# Patient Record
Sex: MALE | Race: OTHER | NOT HISPANIC OR LATINO | URBAN - METROPOLITAN AREA
[De-identification: names, ages, dates, MRNs, and addresses within clinical notes are randomized per-mention and may not be internally consistent; named-entity substitution may affect disease eponyms.]

---

## 2019-10-07 ENCOUNTER — OUTPATIENT (OUTPATIENT)
Dept: OUTPATIENT SERVICES | Facility: HOSPITAL | Age: 69
LOS: 1 days | Discharge: HOME | End: 2019-10-07

## 2019-10-07 DIAGNOSIS — H90.3 SENSORINEURAL HEARING LOSS, BILATERAL: ICD-10-CM

## 2019-12-05 ENCOUNTER — OUTPATIENT (OUTPATIENT)
Dept: OUTPATIENT SERVICES | Facility: HOSPITAL | Age: 69
LOS: 1 days | Discharge: HOME | End: 2019-12-05

## 2019-12-06 DIAGNOSIS — H90.3 SENSORINEURAL HEARING LOSS, BILATERAL: ICD-10-CM

## 2019-12-19 ENCOUNTER — OUTPATIENT (OUTPATIENT)
Dept: OUTPATIENT SERVICES | Facility: HOSPITAL | Age: 69
LOS: 1 days | Discharge: HOME | End: 2019-12-19

## 2019-12-19 DIAGNOSIS — H90.8 MIXED CONDUCTIVE AND SENSORINEURAL HEARING LOSS, UNSPECIFIED: ICD-10-CM

## 2020-01-21 ENCOUNTER — OUTPATIENT (OUTPATIENT)
Dept: OUTPATIENT SERVICES | Facility: HOSPITAL | Age: 70
LOS: 1 days | Discharge: HOME | End: 2020-01-21

## 2020-01-21 DIAGNOSIS — H90.3 SENSORINEURAL HEARING LOSS, BILATERAL: ICD-10-CM

## 2020-02-25 ENCOUNTER — OUTPATIENT (OUTPATIENT)
Dept: OUTPATIENT SERVICES | Facility: HOSPITAL | Age: 70
LOS: 1 days | Discharge: HOME | End: 2020-02-25

## 2020-02-25 DIAGNOSIS — H90.3 SENSORINEURAL HEARING LOSS, BILATERAL: ICD-10-CM

## 2020-03-10 ENCOUNTER — OUTPATIENT (OUTPATIENT)
Dept: OUTPATIENT SERVICES | Facility: HOSPITAL | Age: 70
LOS: 1 days | Discharge: HOME | End: 2020-03-10

## 2020-03-10 DIAGNOSIS — H90.3 SENSORINEURAL HEARING LOSS, BILATERAL: ICD-10-CM

## 2020-05-06 ENCOUNTER — OUTPATIENT (OUTPATIENT)
Dept: OUTPATIENT SERVICES | Facility: HOSPITAL | Age: 70
LOS: 1 days | Discharge: HOME | End: 2020-05-06

## 2020-05-06 DIAGNOSIS — H91.23 SUDDEN IDIOPATHIC HEARING LOSS, BILATERAL: ICD-10-CM

## 2020-06-12 ENCOUNTER — OUTPATIENT (OUTPATIENT)
Dept: OUTPATIENT SERVICES | Facility: HOSPITAL | Age: 70
LOS: 1 days | Discharge: HOME | End: 2020-06-12

## 2020-06-12 DIAGNOSIS — H90.3 SENSORINEURAL HEARING LOSS, BILATERAL: ICD-10-CM

## 2021-01-02 ENCOUNTER — INPATIENT (INPATIENT)
Facility: HOSPITAL | Age: 71
LOS: 7 days | Discharge: HOME | End: 2021-01-10
Attending: HOSPITALIST | Admitting: HOSPITALIST
Payer: MEDICARE

## 2021-01-02 VITALS
DIASTOLIC BLOOD PRESSURE: 68 MMHG | RESPIRATION RATE: 20 BRPM | SYSTOLIC BLOOD PRESSURE: 140 MMHG | TEMPERATURE: 99 F | HEART RATE: 87 BPM | OXYGEN SATURATION: 95 %

## 2021-01-02 PROCEDURE — 99291 CRITICAL CARE FIRST HOUR: CPT

## 2021-01-02 PROCEDURE — 93010 ELECTROCARDIOGRAM REPORT: CPT

## 2021-01-02 RX ORDER — ONDANSETRON 8 MG/1
4 TABLET, FILM COATED ORAL ONCE
Refills: 0 | Status: COMPLETED | OUTPATIENT
Start: 2021-01-02 | End: 2021-01-02

## 2021-01-02 NOTE — ED PROVIDER NOTE - NS ED ROS FT
Constitutional: no fever, chills, no recent weight loss, change in appetite  Eyes: no redness/discharge/pain/vision changes  ENT: no rhinorrhea/ear pain/sore throat  Cardiac: No chest pain, SOB or edema.  Respiratory: No cough or respiratory distress  GI: No diarrhea or abdominal pain.  : No dysuria, frequency, urgency or hematuria  MS: no pain to back or extremities, no loss of ROM  Neuro: No headache or weakness. No LOC.  Skin: No skin rash.  Except as documented in the HPI, all other systems are negative.

## 2021-01-02 NOTE — ED PROVIDER NOTE - CLINICAL SUMMARY MEDICAL DECISION MAKING FREE TEXT BOX
patient remained hemodynamically stable in ED, patient with DKA, and elevated D-dimer in the setting of COVID-19, consulted ICU, patient is accepted to ICU and they will follow the chest CTA when the results are available. CTA chest was ordered, and called radiology core few times and CT tech few times, and requested to have the CTA chest done ASAP. CTA chest was done after the admission to ICU, I called the radiology core and requested them to read the CTA chest and results reviewed upon available, no PE. All the information was communicated with ICU fellow x 2 and ICU MAR and patient in detail. Patient is admitted to ICU in stable condition for further care.

## 2021-01-02 NOTE — ED PROVIDER NOTE - PHYSICAL EXAMINATION
CONSTITUTIONAL: Well-appearing; well-nourished; in no apparent distress.   CARDIOVASCULAR: Normal S1, S2; no murmurs, rubs, or gallops.   RESPIRATORY: Normal chest excursion with respiration; breath sounds clear and equal bilaterally; no wheezes, rhonchi, or rales.  GI/: non-distended; non-tender; no palpable organomegaly.   SKIN: Normal for age and race; warm; dry; good turgor; no apparent lesions or exudate.   NEURO/PSYCH: A & O x 4; grossly unremarkable.

## 2021-01-02 NOTE — ED PROVIDER NOTE - OBJECTIVE STATEMENT
pt with pmhx PUD and DM, recently dx with covid19 1 week ago presents with generalized malaise and weakness as well as nausea/vomiting for 2 days. Denies fever/chill/HA/dizziness/chest pain/palpitation/sob/abd pain/d/ black stool/bloody stool/urinary sxs

## 2021-01-02 NOTE — ED PROVIDER NOTE - ATTENDING CONTRIBUTION TO CARE
Patient is c/o generalized weakness with nausea x 2 days, h/o COVID-19, diagnosed one week ago, has been recovering at home. patient HA/neck pain/back pain, denies abdominal pain, no appetite, and has not been eating well x 2 days. No trauma, no falls. +chest pain and sob on coughing. +generalized weakness with intense nausea.   vitals reviewed.   patient is awake, alert, resting comfortably on the bed, is c/o nausea.   lungs: B/L scattered rhonchi, no crackles  abd: +BS, NT, ND, soft  CNS: awake, alert, o x 3, no meningeal signs noted.   Skin: no rash, no lesions.   A/P: Generalized weakness/nausea  Patient with h/o COVID-19, and DM  labs, IVF, EKG, CXR, reevaluation.

## 2021-01-02 NOTE — ED ADULT NURSE NOTE - OBJECTIVE STATEMENT
Pt. complains of cough accompanied with nausea. Pt. tested (+) COVID approx 1 week ago. Pt. denies any fever, chills, or CP.

## 2021-01-02 NOTE — ED ADULT NURSE NOTE - NSIMPLEMENTINTERV_GEN_ALL_ED
Implemented All Universal Safety Interventions:  [place] to call system. Call bell, personal items and telephone within reach. Instruct patient to call for assistance. Room bathroom lighting operational. Non-slip footwear when patient is off stretcher. Physically safe environment: no spills, clutter or unnecessary equipment. Stretcher in lowest position, wheels locked, appropriate side rails in place.

## 2021-01-03 LAB
ALBUMIN SERPL ELPH-MCNC: 3 G/DL — LOW (ref 3.5–5.2)
ALBUMIN SERPL ELPH-MCNC: 3 G/DL — LOW (ref 3.5–5.2)
ALBUMIN SERPL ELPH-MCNC: 3.3 G/DL — LOW (ref 3.5–5.2)
ALP SERPL-CCNC: 70 U/L — SIGNIFICANT CHANGE UP (ref 30–115)
ALP SERPL-CCNC: 70 U/L — SIGNIFICANT CHANGE UP (ref 30–115)
ALP SERPL-CCNC: 82 U/L — SIGNIFICANT CHANGE UP (ref 30–115)
ALT FLD-CCNC: 38 U/L — SIGNIFICANT CHANGE UP (ref 0–41)
ALT FLD-CCNC: 39 U/L — SIGNIFICANT CHANGE UP (ref 0–41)
ALT FLD-CCNC: 44 U/L — HIGH (ref 0–41)
ANION GAP SERPL CALC-SCNC: 12 MMOL/L — SIGNIFICANT CHANGE UP (ref 7–14)
ANION GAP SERPL CALC-SCNC: 16 MMOL/L — HIGH (ref 7–14)
ANION GAP SERPL CALC-SCNC: 21 MMOL/L — HIGH (ref 7–14)
ANION GAP SERPL CALC-SCNC: 22 MMOL/L — HIGH (ref 7–14)
AST SERPL-CCNC: 46 U/L — HIGH (ref 0–41)
AST SERPL-CCNC: 48 U/L — HIGH (ref 0–41)
AST SERPL-CCNC: 54 U/L — HIGH (ref 0–41)
B-OH-BUTYR SERPL-SCNC: 5.1 MMOL/L — HIGH
B-OH-BUTYR SERPL-SCNC: 5.5 MMOL/L — HIGH
BASE EXCESS BLDV CALC-SCNC: -1.8 MMOL/L — SIGNIFICANT CHANGE UP (ref -2–2)
BASE EXCESS BLDV CALC-SCNC: -2 MMOL/L — SIGNIFICANT CHANGE UP (ref -2–2)
BASOPHILS # BLD AUTO: 0.02 K/UL — SIGNIFICANT CHANGE UP (ref 0–0.2)
BASOPHILS NFR BLD AUTO: 0.6 % — SIGNIFICANT CHANGE UP (ref 0–1)
BILIRUB SERPL-MCNC: 0.7 MG/DL — SIGNIFICANT CHANGE UP (ref 0.2–1.2)
BILIRUB SERPL-MCNC: 0.7 MG/DL — SIGNIFICANT CHANGE UP (ref 0.2–1.2)
BILIRUB SERPL-MCNC: 0.9 MG/DL — SIGNIFICANT CHANGE UP (ref 0.2–1.2)
BUN SERPL-MCNC: 18 MG/DL — SIGNIFICANT CHANGE UP (ref 10–20)
BUN SERPL-MCNC: 22 MG/DL — HIGH (ref 10–20)
BUN SERPL-MCNC: 23 MG/DL — HIGH (ref 10–20)
BUN SERPL-MCNC: 24 MG/DL — HIGH (ref 10–20)
CA-I SERPL-SCNC: 1.16 MMOL/L — SIGNIFICANT CHANGE UP (ref 1.12–1.3)
CA-I SERPL-SCNC: 1.18 MMOL/L — SIGNIFICANT CHANGE UP (ref 1.12–1.3)
CALCIUM SERPL-MCNC: 8.7 MG/DL — SIGNIFICANT CHANGE UP (ref 8.5–10.1)
CALCIUM SERPL-MCNC: 8.8 MG/DL — SIGNIFICANT CHANGE UP (ref 8.5–10.1)
CALCIUM SERPL-MCNC: 8.9 MG/DL — SIGNIFICANT CHANGE UP (ref 8.5–10.1)
CALCIUM SERPL-MCNC: 9 MG/DL — SIGNIFICANT CHANGE UP (ref 8.5–10.1)
CHLORIDE SERPL-SCNC: 90 MMOL/L — LOW (ref 98–110)
CHLORIDE SERPL-SCNC: 92 MMOL/L — LOW (ref 98–110)
CHLORIDE SERPL-SCNC: 99 MMOL/L — SIGNIFICANT CHANGE UP (ref 98–110)
CHLORIDE SERPL-SCNC: 99 MMOL/L — SIGNIFICANT CHANGE UP (ref 98–110)
CO2 SERPL-SCNC: 20 MMOL/L — SIGNIFICANT CHANGE UP (ref 17–32)
CO2 SERPL-SCNC: 20 MMOL/L — SIGNIFICANT CHANGE UP (ref 17–32)
CO2 SERPL-SCNC: 22 MMOL/L — SIGNIFICANT CHANGE UP (ref 17–32)
CO2 SERPL-SCNC: 24 MMOL/L — SIGNIFICANT CHANGE UP (ref 17–32)
CREAT SERPL-MCNC: 0.6 MG/DL — LOW (ref 0.7–1.5)
CREAT SERPL-MCNC: 0.7 MG/DL — SIGNIFICANT CHANGE UP (ref 0.7–1.5)
CREAT SERPL-MCNC: 0.8 MG/DL — SIGNIFICANT CHANGE UP (ref 0.7–1.5)
CREAT SERPL-MCNC: 0.8 MG/DL — SIGNIFICANT CHANGE UP (ref 0.7–1.5)
D DIMER BLD IA.RAPID-MCNC: 1784 NG/ML DDU — HIGH (ref 0–230)
EOSINOPHIL # BLD AUTO: 0.01 K/UL — SIGNIFICANT CHANGE UP (ref 0–0.7)
EOSINOPHIL NFR BLD AUTO: 0.3 % — SIGNIFICANT CHANGE UP (ref 0–8)
GAS PNL BLDV: 135 MMOL/L — LOW (ref 136–145)
GAS PNL BLDV: 136 MMOL/L — SIGNIFICANT CHANGE UP (ref 136–145)
GAS PNL BLDV: SIGNIFICANT CHANGE UP
GAS PNL BLDV: SIGNIFICANT CHANGE UP
GLUCOSE BLDC GLUCOMTR-MCNC: 103 MG/DL — HIGH (ref 70–99)
GLUCOSE BLDC GLUCOMTR-MCNC: 169 MG/DL — HIGH (ref 70–99)
GLUCOSE BLDC GLUCOMTR-MCNC: 176 MG/DL — HIGH (ref 70–99)
GLUCOSE BLDC GLUCOMTR-MCNC: 180 MG/DL — HIGH (ref 70–99)
GLUCOSE BLDC GLUCOMTR-MCNC: 182 MG/DL — HIGH (ref 70–99)
GLUCOSE BLDC GLUCOMTR-MCNC: 182 MG/DL — HIGH (ref 70–99)
GLUCOSE BLDC GLUCOMTR-MCNC: 192 MG/DL — HIGH (ref 70–99)
GLUCOSE BLDC GLUCOMTR-MCNC: 195 MG/DL — HIGH (ref 70–99)
GLUCOSE BLDC GLUCOMTR-MCNC: 195 MG/DL — HIGH (ref 70–99)
GLUCOSE BLDC GLUCOMTR-MCNC: 206 MG/DL — HIGH (ref 70–99)
GLUCOSE BLDC GLUCOMTR-MCNC: 209 MG/DL — HIGH (ref 70–99)
GLUCOSE BLDC GLUCOMTR-MCNC: 213 MG/DL — HIGH (ref 70–99)
GLUCOSE BLDC GLUCOMTR-MCNC: 78 MG/DL — SIGNIFICANT CHANGE UP (ref 70–99)
GLUCOSE SERPL-MCNC: 112 MG/DL — HIGH (ref 70–99)
GLUCOSE SERPL-MCNC: 203 MG/DL — HIGH (ref 70–99)
GLUCOSE SERPL-MCNC: 341 MG/DL — HIGH (ref 70–99)
GLUCOSE SERPL-MCNC: 361 MG/DL — HIGH (ref 70–99)
HCO3 BLDV-SCNC: 23 MMOL/L — SIGNIFICANT CHANGE UP (ref 22–29)
HCO3 BLDV-SCNC: 25 MMOL/L — SIGNIFICANT CHANGE UP (ref 22–29)
HCT VFR BLD CALC: 46 % — SIGNIFICANT CHANGE UP (ref 42–52)
HCT VFR BLD CALC: 47.8 % — SIGNIFICANT CHANGE UP (ref 42–52)
HCT VFR BLDA CALC: 49.7 % — HIGH (ref 34–44)
HCT VFR BLDA CALC: 53.7 % — HIGH (ref 34–44)
HGB BLD CALC-MCNC: 16.2 G/DL — SIGNIFICANT CHANGE UP (ref 14–18)
HGB BLD CALC-MCNC: 17.5 G/DL — SIGNIFICANT CHANGE UP (ref 14–18)
HGB BLD-MCNC: 15.5 G/DL — SIGNIFICANT CHANGE UP (ref 14–18)
HGB BLD-MCNC: 16 G/DL — SIGNIFICANT CHANGE UP (ref 14–18)
IMM GRANULOCYTES NFR BLD AUTO: 0.3 % — SIGNIFICANT CHANGE UP (ref 0.1–0.3)
LACTATE BLDV-MCNC: 1.9 MMOL/L — HIGH (ref 0.5–1.6)
LACTATE BLDV-MCNC: 2.9 MMOL/L — HIGH (ref 0.5–1.6)
LYMPHOCYTES # BLD AUTO: 0.63 K/UL — LOW (ref 1.2–3.4)
LYMPHOCYTES # BLD AUTO: 18.7 % — LOW (ref 20.5–51.1)
MAGNESIUM SERPL-MCNC: 2 MG/DL — SIGNIFICANT CHANGE UP (ref 1.8–2.4)
MAGNESIUM SERPL-MCNC: 2 MG/DL — SIGNIFICANT CHANGE UP (ref 1.8–2.4)
MCHC RBC-ENTMCNC: 29.7 PG — SIGNIFICANT CHANGE UP (ref 27–31)
MCHC RBC-ENTMCNC: 30.2 PG — SIGNIFICANT CHANGE UP (ref 27–31)
MCHC RBC-ENTMCNC: 33.5 G/DL — SIGNIFICANT CHANGE UP (ref 32–37)
MCHC RBC-ENTMCNC: 33.7 G/DL — SIGNIFICANT CHANGE UP (ref 32–37)
MCV RBC AUTO: 88.7 FL — SIGNIFICANT CHANGE UP (ref 80–94)
MCV RBC AUTO: 89.5 FL — SIGNIFICANT CHANGE UP (ref 80–94)
MONOCYTES # BLD AUTO: 0.25 K/UL — SIGNIFICANT CHANGE UP (ref 0.1–0.6)
MONOCYTES NFR BLD AUTO: 7.4 % — SIGNIFICANT CHANGE UP (ref 1.7–9.3)
NEUTROPHILS # BLD AUTO: 2.45 K/UL — SIGNIFICANT CHANGE UP (ref 1.4–6.5)
NEUTROPHILS NFR BLD AUTO: 72.7 % — SIGNIFICANT CHANGE UP (ref 42.2–75.2)
NRBC # BLD: 0 /100 WBCS — SIGNIFICANT CHANGE UP (ref 0–0)
NRBC # BLD: 0 /100 WBCS — SIGNIFICANT CHANGE UP (ref 0–0)
PCO2 BLDV: 39 MMHG — LOW (ref 41–51)
PCO2 BLDV: 48 MMHG — SIGNIFICANT CHANGE UP (ref 41–51)
PH BLDV: 7.32 — SIGNIFICANT CHANGE UP (ref 7.26–7.43)
PH BLDV: 7.38 — SIGNIFICANT CHANGE UP (ref 7.26–7.43)
PHOSPHATE SERPL-MCNC: 3.7 MG/DL — SIGNIFICANT CHANGE UP (ref 2.1–4.9)
PLATELET # BLD AUTO: 204 K/UL — SIGNIFICANT CHANGE UP (ref 130–400)
PLATELET # BLD AUTO: 208 K/UL — SIGNIFICANT CHANGE UP (ref 130–400)
PO2 BLDV: 30 MMHG — SIGNIFICANT CHANGE UP (ref 20–40)
PO2 BLDV: 37 MMHG — SIGNIFICANT CHANGE UP (ref 20–40)
POTASSIUM BLDV-SCNC: 4 MMOL/L — SIGNIFICANT CHANGE UP (ref 3.3–5.6)
POTASSIUM BLDV-SCNC: 4.4 MMOL/L — SIGNIFICANT CHANGE UP (ref 3.3–5.6)
POTASSIUM SERPL-MCNC: 4.1 MMOL/L — SIGNIFICANT CHANGE UP (ref 3.5–5)
POTASSIUM SERPL-MCNC: 4.7 MMOL/L — SIGNIFICANT CHANGE UP (ref 3.5–5)
POTASSIUM SERPL-MCNC: 4.9 MMOL/L — SIGNIFICANT CHANGE UP (ref 3.5–5)
POTASSIUM SERPL-MCNC: 4.9 MMOL/L — SIGNIFICANT CHANGE UP (ref 3.5–5)
POTASSIUM SERPL-SCNC: 4.1 MMOL/L — SIGNIFICANT CHANGE UP (ref 3.5–5)
POTASSIUM SERPL-SCNC: 4.7 MMOL/L — SIGNIFICANT CHANGE UP (ref 3.5–5)
POTASSIUM SERPL-SCNC: 4.9 MMOL/L — SIGNIFICANT CHANGE UP (ref 3.5–5)
POTASSIUM SERPL-SCNC: 4.9 MMOL/L — SIGNIFICANT CHANGE UP (ref 3.5–5)
PROT SERPL-MCNC: 6 G/DL — SIGNIFICANT CHANGE UP (ref 6–8)
PROT SERPL-MCNC: 6.4 G/DL — SIGNIFICANT CHANGE UP (ref 6–8)
PROT SERPL-MCNC: 6.9 G/DL — SIGNIFICANT CHANGE UP (ref 6–8)
RAPID RVP RESULT: DETECTED
RBC # BLD: 5.14 M/UL — SIGNIFICANT CHANGE UP (ref 4.7–6.1)
RBC # BLD: 5.39 M/UL — SIGNIFICANT CHANGE UP (ref 4.7–6.1)
RBC # FLD: 12.7 % — SIGNIFICANT CHANGE UP (ref 11.5–14.5)
RBC # FLD: 12.7 % — SIGNIFICANT CHANGE UP (ref 11.5–14.5)
SAO2 % BLDV: 50 % — SIGNIFICANT CHANGE UP
SAO2 % BLDV: 67 % — SIGNIFICANT CHANGE UP
SARS-COV-2 RNA SPEC QL NAA+PROBE: DETECTED
SODIUM SERPL-SCNC: 132 MMOL/L — LOW (ref 135–146)
SODIUM SERPL-SCNC: 133 MMOL/L — LOW (ref 135–146)
SODIUM SERPL-SCNC: 135 MMOL/L — SIGNIFICANT CHANGE UP (ref 135–146)
SODIUM SERPL-SCNC: 137 MMOL/L — SIGNIFICANT CHANGE UP (ref 135–146)
TROPONIN T SERPL-MCNC: <0.01 NG/ML — SIGNIFICANT CHANGE UP
TROPONIN T SERPL-MCNC: <0.01 NG/ML — SIGNIFICANT CHANGE UP
WBC # BLD: 3.37 K/UL — LOW (ref 4.8–10.8)
WBC # BLD: 3.72 K/UL — LOW (ref 4.8–10.8)
WBC # FLD AUTO: 3.37 K/UL — LOW (ref 4.8–10.8)
WBC # FLD AUTO: 3.72 K/UL — LOW (ref 4.8–10.8)

## 2021-01-03 PROCEDURE — 71045 X-RAY EXAM CHEST 1 VIEW: CPT | Mod: 26

## 2021-01-03 PROCEDURE — 71275 CT ANGIOGRAPHY CHEST: CPT | Mod: 26

## 2021-01-03 PROCEDURE — 93010 ELECTROCARDIOGRAM REPORT: CPT

## 2021-01-03 RX ORDER — SODIUM CHLORIDE 9 MG/ML
1000 INJECTION, SOLUTION INTRAVENOUS ONCE
Refills: 0 | Status: COMPLETED | OUTPATIENT
Start: 2021-01-03 | End: 2021-01-03

## 2021-01-03 RX ORDER — INSULIN HUMAN 100 [IU]/ML
12 INJECTION, SOLUTION SUBCUTANEOUS
Qty: 100 | Refills: 0 | Status: DISCONTINUED | OUTPATIENT
Start: 2021-01-03 | End: 2021-01-03

## 2021-01-03 RX ORDER — DEXTROSE 10 % IN WATER 10 %
1000 INTRAVENOUS SOLUTION INTRAVENOUS
Refills: 0 | Status: DISCONTINUED | OUTPATIENT
Start: 2021-01-03 | End: 2021-01-03

## 2021-01-03 RX ORDER — SODIUM CHLORIDE 9 MG/ML
1000 INJECTION, SOLUTION INTRAVENOUS
Refills: 0 | Status: DISCONTINUED | OUTPATIENT
Start: 2021-01-03 | End: 2021-01-03

## 2021-01-03 RX ORDER — SODIUM CHLORIDE 9 MG/ML
1000 INJECTION, SOLUTION INTRAVENOUS
Refills: 0 | Status: DISCONTINUED | OUTPATIENT
Start: 2021-01-03 | End: 2021-01-04

## 2021-01-03 RX ORDER — DEXTROSE 50 % IN WATER 50 %
50 SYRINGE (ML) INTRAVENOUS ONCE
Refills: 0 | Status: DISCONTINUED | OUTPATIENT
Start: 2021-01-03 | End: 2021-01-03

## 2021-01-03 RX ORDER — DEXTROSE 50 % IN WATER 50 %
25 SYRINGE (ML) INTRAVENOUS ONCE
Refills: 0 | Status: COMPLETED | OUTPATIENT
Start: 2021-01-03 | End: 2021-01-03

## 2021-01-03 RX ORDER — INSULIN HUMAN 100 [IU]/ML
6 INJECTION, SOLUTION SUBCUTANEOUS
Qty: 100 | Refills: 0 | Status: DISCONTINUED | OUTPATIENT
Start: 2021-01-03 | End: 2021-01-03

## 2021-01-03 RX ORDER — ENOXAPARIN SODIUM 100 MG/ML
40 INJECTION SUBCUTANEOUS AT BEDTIME
Refills: 0 | Status: DISCONTINUED | OUTPATIENT
Start: 2021-01-03 | End: 2021-01-04

## 2021-01-03 RX ORDER — DEXTROSE 50 % IN WATER 50 %
12.5 SYRINGE (ML) INTRAVENOUS ONCE
Refills: 0 | Status: DISCONTINUED | OUTPATIENT
Start: 2021-01-03 | End: 2021-01-03

## 2021-01-03 RX ORDER — ONDANSETRON 8 MG/1
4 TABLET, FILM COATED ORAL ONCE
Refills: 0 | Status: COMPLETED | OUTPATIENT
Start: 2021-01-03 | End: 2021-01-03

## 2021-01-03 RX ORDER — INSULIN HUMAN 100 [IU]/ML
1 INJECTION, SOLUTION SUBCUTANEOUS
Qty: 100 | Refills: 0 | Status: DISCONTINUED | OUTPATIENT
Start: 2021-01-03 | End: 2021-01-04

## 2021-01-03 RX ADMIN — ONDANSETRON 4 MILLIGRAM(S): 8 TABLET, FILM COATED ORAL at 02:20

## 2021-01-03 RX ADMIN — INSULIN HUMAN 6 UNIT(S)/HR: 100 INJECTION, SOLUTION SUBCUTANEOUS at 04:19

## 2021-01-03 RX ADMIN — Medication 25 MILLILITER(S): at 13:00

## 2021-01-03 RX ADMIN — SODIUM CHLORIDE 100 MILLILITER(S): 9 INJECTION, SOLUTION INTRAVENOUS at 08:25

## 2021-01-03 RX ADMIN — SODIUM CHLORIDE 100 MILLILITER(S): 9 INJECTION, SOLUTION INTRAVENOUS at 15:00

## 2021-01-03 RX ADMIN — INSULIN HUMAN 1 UNIT(S)/HR: 100 INJECTION, SOLUTION SUBCUTANEOUS at 12:40

## 2021-01-03 RX ADMIN — SODIUM CHLORIDE 1000 MILLILITER(S): 9 INJECTION, SOLUTION INTRAVENOUS at 02:20

## 2021-01-03 RX ADMIN — ENOXAPARIN SODIUM 40 MILLIGRAM(S): 100 INJECTION SUBCUTANEOUS at 21:41

## 2021-01-03 RX ADMIN — ONDANSETRON 4 MILLIGRAM(S): 8 TABLET, FILM COATED ORAL at 00:09

## 2021-01-03 RX ADMIN — INSULIN HUMAN 12 UNIT(S)/HR: 100 INJECTION, SOLUTION SUBCUTANEOUS at 08:25

## 2021-01-03 NOTE — CONSULT NOTE ADULT - SUBJECTIVE AND OBJECTIVE BOX
Patient is a 70y old  Male who presents with a chief complaint of weakness (03 Jan 2021 07:01)      HPI:  70 year old male with a PMhx of DMII, PUD.     CC: weakness and decreased PO intake    History goes back to: 2 days ago when patient started having decreased PO intake, and started feeling very fatigued. Patients weakness got worse and so he presented to the ED    ROS is positive for: patient reports chills at home, pleuritic chest pain (only on coughing and taking a deep breath). Additionally patient reports some diffuse abdominal pain, and mild nausea.     ROS is negative for: no SOB, no palpitations. Patient denies change in urinary or bowel habits. No lower extremity edema /swelling     Of note: patient was diagnosed with COVID almost 10 days ago and he was feeling carrol until 2 days ago.     In the ED: vital signs WNL  Labs significant for: d-dimer 1700, b-oh:5, glucose: 361  Troponin: negative  CT A chest: No pulmonary embolus.  Scattered peripheral ground - glass opacities, compatible with viral/infectious etiology.  Multiple bilateral pulmonary nodules measuring up to 6 mm in the left upper lobe.  Patient admitted for DKA.      (03 Jan 2021 07:01)      PAST MEDICAL & SURGICAL HISTORY:  DM (diabetes mellitus)    No significant past surgical history        FAMILY HISTORY:  :  No known cardiovacular family hisotry     Review Of Systems:     All ROS are negative except per HPI       Allergies    No Known Allergies    Intolerances          PHYSICAL EXAM    ICU Vital Signs Last 24 Hrs  T(C): 36.4 (03 Jan 2021 08:20), Max: 37.3 (02 Jan 2021 22:01)  T(F): 97.5 (03 Jan 2021 08:20), Max: 99.1 (02 Jan 2021 22:01)  HR: 93 (03 Jan 2021 08:00) (87 - 93)  BP: 144/72 (03 Jan 2021 08:00) (127/87 - 173/88)  BP(mean): --  ABP: --  ABP(mean): --  RR: 20 (03 Jan 2021 08:00) (20 - 20)  SpO2: 99% (03 Jan 2021 08:00) (95% - 99%)      CONSTITUTIONAL:  Well nourished.   NAD    ENT:   Airway patent,   Mouth with dry mucosa.   No thrush  on 2 liters      CARDIAC:   Normal rate,   Regular rhythm.    No edema    RESPIRATORY:   No wheezing  Bilateral BS   Not tachypneic,  No use of accessory muscles  b/l crackles    GASTROINTESTINAL:  Abdomen soft,   epigastric tenderness,   No guarding,       NEUROLOGICAL:   Alert and oriented   No motor deficits.    SKIN:   Skin normal color for race,   No evidence of rash.      HEME LYMPH: .  No cervical  lymphadenopathy.  No inguinal lymphadenopathy              LABS:                          16.0   3.37  )-----------( 208      ( 03 Jan 2021 00:16 )             47.8                                               01-03    133<L>  |  92<L>  |  23<H>  ----------------------------<  341<H>  4.9   |  20  |  0.8    Ca    8.9      03 Jan 2021 05:48  Phos  3.7     01-03  Mg     2.0     01-03    TPro  6.9  /  Alb  3.3<L>  /  TBili  0.9  /  DBili  x   /  AST  54<H>  /  ALT  44<H>  /  AlkPhos  82  01-03                                                 CARDIAC MARKERS ( 03 Jan 2021 00:16 )  x     / <0.01 ng/mL / x     / x     / x                                                LIVER FUNCTIONS - ( 03 Jan 2021 00:16 )  Alb: 3.3 g/dL / Pro: 6.9 g/dL / ALK PHOS: 82 U/L / ALT: 44 U/L / AST: 54 U/L / GGT: x                                                                                                                                       X-Rays reviewed                                                                                     ECHO    CXR interpreted by me     MEDICATIONS  (STANDING):  enoxaparin Injectable 40 milliGRAM(s) SubCutaneous at bedtime  insulin regular Infusion 6 Unit(s)/Hr (6 mL/Hr) IV Continuous <Continuous>  sodium chloride 0.45%. 1000 milliLiter(s) (100 mL/Hr) IV Continuous <Continuous>    MEDICATIONS  (PRN):         Patient is a 70y old  Male who presents with a chief complaint of weakness (03 Jan 2021 07:01)      HPI:  70 year old male with a PMhx of DMII, PUD.     CC: weakness and decreased PO intake    History goes back to: 2 days ago when patient started having decreased PO intake, and started feeling very fatigued. Patients weakness got worse and so he presented to the ED    ROS is positive for: patient reports chills at home, pleuritic chest pain (only on coughing and taking a deep breath). Additionally patient reports some diffuse abdominal pain, and mild nausea.     Of note: patient was diagnosed with COVID almost 10 days ago and he was feeling carrol until 2 days ago.     In the ED: vital signs WNL  Labs significant for: d-dimer 1700, b-oh:5, glucose: 361  Troponin: negative  CT A chest: No pulmonary embolus.  Scattered peripheral ground - glass opacities, compatible with viral/infectious etiology.  Multiple bilateral pulmonary nodules measuring up to 6 mm in the left upper lobe.  Patient admitted for DKA called to [facility]      (03 Jan 2021 07:01)      PAST MEDICAL & SURGICAL HISTORY:  DM (diabetes mellitus)    No significant past surgical history        FAMILY HISTORY:  :  No known cardiovascular family history    Review Of Systems:     All ROS are negative except per HPI       Allergies    No Known Allergies    Intolerances          PHYSICAL EXAM    ICU Vital Signs Last 24 Hrs  T(C): 36.4 (03 Jan 2021 08:20), Max: 37.3 (02 Jan 2021 22:01)  T(F): 97.5 (03 Jan 2021 08:20), Max: 99.1 (02 Jan 2021 22:01)  HR: 93 (03 Jan 2021 08:00) (87 - 93)  BP: 144/72 (03 Jan 2021 08:00) (127/87 - 173/88)  RR: 20 (03 Jan 2021 08:00) (20 - 20)  SpO2: 99% (03 Jan 2021 08:00) (95% - 99%)      CONSTITUTIONAL:  ill looking    ENT:   Airway patent,   Mouth with dry mucosa.   No thrush  on 2 liters      CARDIAC:   Normal rate,   Regular rhythm.    No edema    RESPIRATORY:   No wheezing  Bilateral BS   Not tachypneic,  No use of accessory muscles  b/l crackles    GASTROINTESTINAL:  Abdomen soft,   epigastric tenderness,   No guarding,       NEUROLOGICAL:   Alert and oriented   No motor deficits.    SKIN:   Skin normal color for race,   No evidence of rash.                LABS:                          16.0   3.37  )-----------( 208      ( 03 Jan 2021 00:16 )             47.8                                               01-03    133<L>  |  92<L>  |  23<H>  ----------------------------<  341<H>  4.9   |  20  |  0.8    Ca    8.9      03 Jan 2021 05:48  Phos  3.7     01-03  Mg     2.0     01-03    TPro  6.9  /  Alb  3.3<L>  /  TBili  0.9  /  DBili  x   /  AST  54<H>  /  ALT  44<H>  /  AlkPhos  82  01-03                                                 CARDIAC MARKERS ( 03 Jan 2021 00:16 )  x     / <0.01 ng/mL / x     / x     / x                                                LIVER FUNCTIONS - ( 03 Jan 2021 00:16 )  Alb: 3.3 g/dL / Pro: 6.9 g/dL / ALK PHOS: 82 U/L / ALT: 44 U/L / AST: 54 U/L / GGT: x                                                                                                                                       X-Rays reviewed   reviewed    MEDICATIONS  (STANDING):  enoxaparin Injectable 40 milliGRAM(s) SubCutaneous at bedtime  insulin regular Infusion 6 Unit(s)/Hr (6 mL/Hr) IV Continuous <Continuous>  sodium chloride 0.45%. 1000 milliLiter(s) (100 mL/Hr) IV Continuous <Continuous>    MEDICATIONS  (PRN):

## 2021-01-03 NOTE — CONSULT NOTE ADULT - ASSESSMENT
IMPRESSION:  HAGMA secondary to DKA  Covid Pna  HO HTN    PLAN:    CNS: avoid sedatives    HEENT: Oral care    PULMONARY:  HOB @ 45 degrees.  Aspiration precautions. wean off o2. Goal Pox >94%. If requiring O2 to stay above 94 percent then start dexa 6mg q24h. start antitussive ATC.    CARDIOVASCULAR: Goal Map >65. IVF NS @ 100cc/hr, when FS <250 start D5NS. check trop x2    GI: GI prophylaxis.  Feeding.  Bowel regimen. check Lipase    RENAL:  Follow up lytes.  Correct as needed. Monitor potassium and AG qround.    INFECTIOUS DISEASE: Follow up cultures    HEMATOLOGICAL:  DVT prophylaxis.    ENDOCRINE:  Follow up FS.  Insulin drip 0.01U/kg/hr. transition to SQ after gap closes.    MUSCULOSKELETAL: bedrest    Dispo: critical care bed in CEU or ED3      IMPRESSION:    DKA  Covid Pneumonia   HO HTN    PLAN:    CNS: avoid sedatives    HEENT: Oral care    PULMONARY:  HOB @ 45 degrees.  Aspiration precautions. wean off o2. Goal Pox >94%. If requiring O2 to stay above 94 percent then start dexa 6mg q24h. start antitussive ATC.    CARDIOVASCULAR: Goal Map >65. IVF LR @ 100cc/hr, when FS <250 start D5 1/2NS. check trop x2    GI: GI prophylaxis.  Feeding.  Bowel regimen. check Lipase    RENAL:  Follow up lytes.  Correct as needed. Monitor potassium and AG qround.    INFECTIOUS DISEASE: Follow up cultures, f/up infl markers    HEMATOLOGICAL:  DVT prophylaxis.    ENDOCRINE:  Follow up FS.  Insulin drip 0.01U/kg/hr. transition to SQ after gap closes. Endo eval    MUSCULOSKELETAL: bedrest    Dispo: critical care bed in CEU or ED3

## 2021-01-03 NOTE — H&P ADULT - HISTORY OF PRESENT ILLNESS
year old   with a PMhx of       CC:    History goes back to:     ROS is positive for:     ROS is negative for:     Of note: patient was diagnosed with COVID 1 week ago    In the ED: vital signs WNL  Labs significant for: d-dimer 1700, b-oh:5, glucose: 361  Troponin: negative  EKG:   CT A chest: No pulmonary embolus.  Scattered peripheral groundglass opacities, compatible with viral/infectious etiology.  Multiple bilateral pulmonary nodules measuring up to 6 mm in the left upper lobe.    Patient admitted for DKA due to       70 year old male with a PMhx of       CC:    History goes back to:     ROS is positive for:     ROS is negative for:     Of note: patient was diagnosed with COVID 1 week ago    In the ED: vital signs WNL  Labs significant for: d-dimer 1700, b-oh:5, glucose: 361  Troponin: negative  EKG:   CT A chest: No pulmonary embolus.  Scattered peripheral groundglass opacities, compatible with viral/infectious etiology.  Multiple bilateral pulmonary nodules measuring up to 6 mm in the left upper lobe.    Patient admitted for DKA due to       70 year old male with a PMhx of DMII, PUD.     CC: weakness and decreased PO intake    History goes back to: 2 days ago when patient started having decreased PO intake, and started feeling very fatigued. Patients weakness got worse and so he presented to the ED    ROS is positive for: patient reports chills at home, pleuritic chest pain (only on coughing and taking a deep breath). Additionally patient reports some diffuse abdominal pain, and mild nausea.     ROS is negative for: no SOB, no palpitations. Patient denies change in urinary or bowel habits. No lower extremity edema /swelling     Of note: patient was diagnosed with COVID almost 10 days ago and he was feeling carrol until 2 days ago.     In the ED: vital signs WNL  Labs significant for: d-dimer 1700, b-oh:5, glucose: 361  Troponin: negative  CT A chest: No pulmonary embolus.  Scattered peripheral ground - glass opacities, compatible with viral/infectious etiology.  Multiple bilateral pulmonary nodules measuring up to 6 mm in the left upper lobe.  Patient admitted for DKA.

## 2021-01-03 NOTE — H&P ADULT - NSHPLABSRESULTS_GEN_ALL_CORE
VITALS:   T(F): 98.4  HR: 93  BP: 173/88  RR: 20  SpO2: 97%    LABS:                        16.0   3.37  )-----------( 208      ( 03 Jan 2021 00:16 )             47.8     01-03    133<L>  |  92<L>  |  23<H>  ----------------------------<  341<H>  4.9   |  20  |  0.8    Ca    8.9      03 Jan 2021 05:48  Phos  3.7     01-03  Mg     2.0     01-03    TPro  6.9  /  Alb  3.3<L>  /  TBili  0.9  /  DBili  x   /  AST  54<H>  /  ALT  44<H>  /  AlkPhos  82  01-03          Troponin T, Serum: <0.01 ng/mL (01-03-21 @ 00:16)      CARDIAC MARKERS ( 03 Jan 2021 00:16 )  x     / <0.01 ng/mL / x     / x     / x

## 2021-01-03 NOTE — H&P ADULT - NSHPPHYSICALEXAM_GEN_ALL_CORE
PHYSICAL EXAM:  GENERAL: NAD, speaks in full sentences, no signs of respiratory distress  HEAD: Atraumatic  NECK: Supple  CHEST/LUNG: Clear to auscultation bilaterally; No wheeze or crackles  HEART: S1, S2; RRR; No murmurs, rubs, or gallops  ABDOMEN: BS+; Soft, Non-tender, Non-distended  EXTREMITIES:  2+ Peripheral Pulses, No clubbing, cyanosis, or edema  PSYCH: AAOx3  NEUROLOGY: non-focal  SKIN: No rashes or lesions PHYSICAL EXAM:  GENERAL: NAD, speaks in full sentences, no signs of respiratory distress  HEAD: Atraumatic  NECK: Supple  CHEST/LUNG: Clear to auscultation bilaterally; No wheeze or crackles  HEART: S1, S2; RRR; No murmurs, rubs, or gallops  ABDOMEN: BS+; Soft, mild abdominal tenderness to palpation   EXTREMITIES:  2+ Peripheral Pulses, No clubbing, cyanosis, or edema  PSYCH: AAOx3  NEUROLOGY: non-focal  SKIN: No rashes or lesions

## 2021-01-03 NOTE — H&P ADULT - ASSESSMENT
IMPRESSION:  #DKA  #HAGMA   #COVID 19 PNA       PLAN:    CNS:    HEENT:    PULMONARY:    CARDIOVASCULAR:    GI: GI prophylaxis.  Feeding     RENAL:    INFECTIOUS DISEASE:    HEMATOLOGICAL:  DVT prophylaxis.    ENDOCRINE:  Follow up FS.  Insulin protocol if needed.    MUSCULOSKELETAL:    TIME SPENT:           IMPRESSION:  #DKA  #HAGMA   #COVID 19 PNA     PLAN:    IMPRESSION:       PLAN:    CNS: Avoid CNS depressants.    HEENT:  Oral care.     PULMONARY:  HOB @ 45 degree.     CARDIOVASCULAR: IV fluids    GI: GI prophylaxis.  NGT.  Feeding outside BiPAP  if tolerated                              RENAL:  HAGMA due to DKA  - monitor for hypokalemia and hypophosphatemia    INFECTIOUS DISEASE: COVID PNA  Moderate disease, no signs of CRS  manifestations: PNA   - follow up inflammatory markers (procal, crp, ferritin)   - patient spo2=98% on 2L oxygen  - no remdesivir given onset of symptoms   - will start dexamethasone.   - Low suspicion for concomitant bacterial PNA so will hold AN-B  - f/u Ucx, f/u Bcx  - Lovenox     HEMATOLOGICAL:  DVT prophylaxis.     ENDOCRINE:  Follow up FS.  Insulin protocol if needed.    DISPOSITION: ICU     IMPRESSION:  #DKA - precipitated by viral infection   #HAGMA   # possible pericarditis?  #COVID 19 PNA - mild       PLAN:    CNS: Avoid CNS depressants.    HEENT:  Oral care.     PULMONARY:  HOB @ 45 degree.     CARDIOVASCULAR: IV fluids, NS 100cc/hr  possible pericarditis pain?  - echocardiogram   - trend trops  - consider cardio eval     GI: GI prophylaxis.                      RENAL:  HAGMA due to DKA  - monitor for hypokalemia and hypophosphatemia    INFECTIOUS DISEASE: COVID PNA  mild disease, no signs of CRS  manifestations: PNA   - follow up inflammatory markers (procal, crp, ferritin)   - patient spo2=98% on room air  - no remdesivir given onset of symptoms   - no indication for dexamethasone unless repiratory status worsening  - Low suspicion for concomitant bacterial PNA so will hold AN-B  - Lovenox 40 daily     HEMATOLOGICAL:  DVT prophylaxis.     ENDOCRINE:  DKA  B-OH=5, AG=22, OJ=434  s/p 2L of LR fluid  - insulin drip at 12u/hr. Once FS is 200 then make insulin drip at a rate of 0.05 U/kg/hr  - IV fluids: NS 100cc/hr  - keep NPO  - BMP around the clock. Monitor anion gap, monitor K, phos  - FS every 1 hr when on insulin drip   - consider resuming diet once FS approx 200 AND patient can tolerate po intake AND anion gap is closed  - once patient tolerates PO diet then switch to long acting insulin and then stop   - hba1c for tomorrow     DISPOSITION: ICU     IMPRESSION:  #DKA - precipitated by viral infection   #HAGMA   # possible pericarditis?  #COVID 19 PNA - mild       PLAN:    CNS: Avoid CNS depressants.    HEENT:  Oral care.     PULMONARY:  HOB @ 45 degree.     CARDIOVASCULAR: IV fluids, NS 100cc/hr  possible pericarditis pain?  - echocardiogram   - trend trops  - consider cardio eval     GI: GI prophylaxis.                      RENAL:  HAGMA due to DKA  - monitor for hypokalemia and hypophosphatemia    INFECTIOUS DISEASE: COVID PNA  mild disease, no signs of CRS  manifestations: PNA   - follow up inflammatory markers (procal, crp, ferritin)   - patient spo2=98% on room air  - no remdesivir given onset of symptoms   - no indication for dexamethasone unless repiratory status worsening  - Low suspicion for concomitant bacterial PNA so will hold AN-B  - Lovenox 40 daily     HEMATOLOGICAL:  DVT prophylaxis.     ENDOCRINE:  DKA  B-OH=5, AG=22, MW=835  s/p 1L of LR fluid bolus  - insulin drip at 12u/hr. Once FS is 200 then make insulin drip at a rate of 0.05 U/kg/hr  - IV fluids: NS 100cc/hr  - keep NPO  - BMP around the clock. Monitor anion gap, monitor K, phos  - FS every 1 hr when on insulin drip   - consider resuming diet once FS approx 200 AND patient can tolerate po intake AND anion gap is closed  - once patient tolerates PO diet then switch to long acting insulin and then stop   - hba1c for tomorrow     DISPOSITION: ICU

## 2021-01-04 LAB
ALBUMIN SERPL ELPH-MCNC: 2.7 G/DL — LOW (ref 3.5–5.2)
ALP SERPL-CCNC: 71 U/L — SIGNIFICANT CHANGE UP (ref 30–115)
ALT FLD-CCNC: 39 U/L — SIGNIFICANT CHANGE UP (ref 0–41)
ANION GAP SERPL CALC-SCNC: 11 MMOL/L — SIGNIFICANT CHANGE UP (ref 7–14)
ANION GAP SERPL CALC-SCNC: 12 MMOL/L — SIGNIFICANT CHANGE UP (ref 7–14)
AST SERPL-CCNC: 54 U/L — HIGH (ref 0–41)
BILIRUB SERPL-MCNC: 1.3 MG/DL — HIGH (ref 0.2–1.2)
BUN SERPL-MCNC: 12 MG/DL — SIGNIFICANT CHANGE UP (ref 10–20)
BUN SERPL-MCNC: 14 MG/DL — SIGNIFICANT CHANGE UP (ref 10–20)
CALCIUM SERPL-MCNC: 8.2 MG/DL — LOW (ref 8.5–10.1)
CALCIUM SERPL-MCNC: 8.3 MG/DL — LOW (ref 8.5–10.1)
CHLORIDE SERPL-SCNC: 100 MMOL/L — SIGNIFICANT CHANGE UP (ref 98–110)
CHLORIDE SERPL-SCNC: 100 MMOL/L — SIGNIFICANT CHANGE UP (ref 98–110)
CO2 SERPL-SCNC: 24 MMOL/L — SIGNIFICANT CHANGE UP (ref 17–32)
CO2 SERPL-SCNC: 24 MMOL/L — SIGNIFICANT CHANGE UP (ref 17–32)
CREAT SERPL-MCNC: 0.5 MG/DL — LOW (ref 0.7–1.5)
CREAT SERPL-MCNC: 0.6 MG/DL — LOW (ref 0.7–1.5)
CRP SERPL-MCNC: 5.51 MG/DL — HIGH (ref 0–0.4)
FERRITIN SERPL-MCNC: 899 NG/ML — HIGH (ref 30–400)
FIBRINOGEN PPP-MCNC: >700 MG/DL — HIGH (ref 204.4–570.6)
GLUCOSE BLDC GLUCOMTR-MCNC: 143 MG/DL — HIGH (ref 70–99)
GLUCOSE BLDC GLUCOMTR-MCNC: 154 MG/DL — HIGH (ref 70–99)
GLUCOSE BLDC GLUCOMTR-MCNC: 159 MG/DL — HIGH (ref 70–99)
GLUCOSE BLDC GLUCOMTR-MCNC: 159 MG/DL — HIGH (ref 70–99)
GLUCOSE BLDC GLUCOMTR-MCNC: 160 MG/DL — HIGH (ref 70–99)
GLUCOSE BLDC GLUCOMTR-MCNC: 165 MG/DL — HIGH (ref 70–99)
GLUCOSE BLDC GLUCOMTR-MCNC: 165 MG/DL — HIGH (ref 70–99)
GLUCOSE BLDC GLUCOMTR-MCNC: 185 MG/DL — HIGH (ref 70–99)
GLUCOSE BLDC GLUCOMTR-MCNC: 193 MG/DL — HIGH (ref 70–99)
GLUCOSE BLDC GLUCOMTR-MCNC: 198 MG/DL — HIGH (ref 70–99)
GLUCOSE BLDC GLUCOMTR-MCNC: 200 MG/DL — HIGH (ref 70–99)
GLUCOSE BLDC GLUCOMTR-MCNC: 209 MG/DL — HIGH (ref 70–99)
GLUCOSE BLDC GLUCOMTR-MCNC: 215 MG/DL — HIGH (ref 70–99)
GLUCOSE BLDC GLUCOMTR-MCNC: 218 MG/DL — HIGH (ref 70–99)
GLUCOSE BLDC GLUCOMTR-MCNC: 224 MG/DL — HIGH (ref 70–99)
GLUCOSE SERPL-MCNC: 253 MG/DL — HIGH (ref 70–99)
GLUCOSE SERPL-MCNC: 54 MG/DL — CRITICAL LOW (ref 70–99)
HCT VFR BLD CALC: 45.2 % — SIGNIFICANT CHANGE UP (ref 42–52)
HCV AB S/CO SERPL IA: 0.03 COI — SIGNIFICANT CHANGE UP
HCV AB SERPL-IMP: SIGNIFICANT CHANGE UP
HGB BLD-MCNC: 15.4 G/DL — SIGNIFICANT CHANGE UP (ref 14–18)
INR BLD: 1.1 RATIO — SIGNIFICANT CHANGE UP (ref 0.65–1.3)
MAGNESIUM SERPL-MCNC: 1.6 MG/DL — LOW (ref 1.8–2.4)
MCHC RBC-ENTMCNC: 30 PG — SIGNIFICANT CHANGE UP (ref 27–31)
MCHC RBC-ENTMCNC: 34.1 G/DL — SIGNIFICANT CHANGE UP (ref 32–37)
MCV RBC AUTO: 87.9 FL — SIGNIFICANT CHANGE UP (ref 80–94)
NRBC # BLD: 0 /100 WBCS — SIGNIFICANT CHANGE UP (ref 0–0)
PLATELET # BLD AUTO: 218 K/UL — SIGNIFICANT CHANGE UP (ref 130–400)
POTASSIUM SERPL-MCNC: 3.8 MMOL/L — SIGNIFICANT CHANGE UP (ref 3.5–5)
POTASSIUM SERPL-MCNC: 4 MMOL/L — SIGNIFICANT CHANGE UP (ref 3.5–5)
POTASSIUM SERPL-SCNC: 3.8 MMOL/L — SIGNIFICANT CHANGE UP (ref 3.5–5)
POTASSIUM SERPL-SCNC: 4 MMOL/L — SIGNIFICANT CHANGE UP (ref 3.5–5)
PROCALCITONIN SERPL-MCNC: 0.11 NG/ML — HIGH (ref 0.02–0.1)
PROT SERPL-MCNC: 5.8 G/DL — LOW (ref 6–8)
PROTHROM AB SERPL-ACNC: 12.7 SEC — SIGNIFICANT CHANGE UP (ref 9.95–12.87)
RBC # BLD: 5.14 M/UL — SIGNIFICANT CHANGE UP (ref 4.7–6.1)
RBC # FLD: 12.8 % — SIGNIFICANT CHANGE UP (ref 11.5–14.5)
SODIUM SERPL-SCNC: 135 MMOL/L — SIGNIFICANT CHANGE UP (ref 135–146)
SODIUM SERPL-SCNC: 136 MMOL/L — SIGNIFICANT CHANGE UP (ref 135–146)
WBC # BLD: 4.32 K/UL — LOW (ref 4.8–10.8)
WBC # FLD AUTO: 4.32 K/UL — LOW (ref 4.8–10.8)

## 2021-01-04 PROCEDURE — 99232 SBSQ HOSP IP/OBS MODERATE 35: CPT

## 2021-01-04 RX ORDER — ONDANSETRON 8 MG/1
4 TABLET, FILM COATED ORAL ONCE
Refills: 0 | Status: COMPLETED | OUTPATIENT
Start: 2021-01-04 | End: 2021-01-04

## 2021-01-04 RX ORDER — MAGNESIUM SULFATE 500 MG/ML
2 VIAL (ML) INJECTION ONCE
Refills: 0 | Status: COMPLETED | OUTPATIENT
Start: 2021-01-04 | End: 2021-01-04

## 2021-01-04 RX ORDER — GLUCAGON INJECTION, SOLUTION 0.5 MG/.1ML
1 INJECTION, SOLUTION SUBCUTANEOUS ONCE
Refills: 0 | Status: DISCONTINUED | OUTPATIENT
Start: 2021-01-04 | End: 2021-01-10

## 2021-01-04 RX ORDER — SODIUM CHLORIDE 9 MG/ML
1000 INJECTION, SOLUTION INTRAVENOUS
Refills: 0 | Status: DISCONTINUED | OUTPATIENT
Start: 2021-01-04 | End: 2021-01-10

## 2021-01-04 RX ORDER — SODIUM CHLORIDE 9 MG/ML
1000 INJECTION INTRAMUSCULAR; INTRAVENOUS; SUBCUTANEOUS
Refills: 0 | Status: DISCONTINUED | OUTPATIENT
Start: 2021-01-04 | End: 2021-01-05

## 2021-01-04 RX ORDER — DEXTROSE 50 % IN WATER 50 %
25 SYRINGE (ML) INTRAVENOUS ONCE
Refills: 0 | Status: DISCONTINUED | OUTPATIENT
Start: 2021-01-04 | End: 2021-01-10

## 2021-01-04 RX ORDER — INSULIN HUMAN 100 [IU]/ML
2 INJECTION, SOLUTION SUBCUTANEOUS
Qty: 100 | Refills: 0 | Status: DISCONTINUED | OUTPATIENT
Start: 2021-01-04 | End: 2021-01-05

## 2021-01-04 RX ORDER — INSULIN GLARGINE 100 [IU]/ML
20 INJECTION, SOLUTION SUBCUTANEOUS EVERY MORNING
Refills: 0 | Status: DISCONTINUED | OUTPATIENT
Start: 2021-01-04 | End: 2021-01-07

## 2021-01-04 RX ORDER — INSULIN LISPRO 100/ML
7 VIAL (ML) SUBCUTANEOUS
Refills: 0 | Status: DISCONTINUED | OUTPATIENT
Start: 2021-01-04 | End: 2021-01-07

## 2021-01-04 RX ORDER — DEXTROSE 50 % IN WATER 50 %
15 SYRINGE (ML) INTRAVENOUS ONCE
Refills: 0 | Status: DISCONTINUED | OUTPATIENT
Start: 2021-01-04 | End: 2021-01-10

## 2021-01-04 RX ORDER — INSULIN HUMAN 100 [IU]/ML
2 INJECTION, SOLUTION SUBCUTANEOUS
Qty: 50 | Refills: 0 | Status: DISCONTINUED | OUTPATIENT
Start: 2021-01-04 | End: 2021-01-04

## 2021-01-04 RX ORDER — DEXTROSE 50 % IN WATER 50 %
12.5 SYRINGE (ML) INTRAVENOUS ONCE
Refills: 0 | Status: DISCONTINUED | OUTPATIENT
Start: 2021-01-04 | End: 2021-01-10

## 2021-01-04 RX ADMIN — INSULIN HUMAN 2 UNIT(S)/HR: 100 INJECTION, SOLUTION SUBCUTANEOUS at 11:06

## 2021-01-04 RX ADMIN — Medication 7 UNIT(S): at 18:08

## 2021-01-04 RX ADMIN — INSULIN GLARGINE 20 UNIT(S): 100 INJECTION, SOLUTION SUBCUTANEOUS at 11:08

## 2021-01-04 RX ADMIN — Medication 25 GRAM(S): at 11:06

## 2021-01-04 RX ADMIN — SODIUM CHLORIDE 100 MILLILITER(S): 9 INJECTION, SOLUTION INTRAVENOUS at 10:14

## 2021-01-04 RX ADMIN — ONDANSETRON 4 MILLIGRAM(S): 8 TABLET, FILM COATED ORAL at 10:13

## 2021-01-04 RX ADMIN — INSULIN HUMAN 1 UNIT(S)/HR: 100 INJECTION, SOLUTION SUBCUTANEOUS at 07:00

## 2021-01-04 RX ADMIN — SODIUM CHLORIDE 75 MILLILITER(S): 9 INJECTION INTRAMUSCULAR; INTRAVENOUS; SUBCUTANEOUS at 18:24

## 2021-01-04 RX ADMIN — SODIUM CHLORIDE 100 MILLILITER(S): 9 INJECTION, SOLUTION INTRAVENOUS at 18:25

## 2021-01-04 NOTE — PROGRESS NOTE ADULT - ASSESSMENT
IMPRESSION:  #DKA - precipitated by viral infection   #HAGMA   # possible pericarditis?  #COVID 19 PNA - mild       PLAN:    CNS: Avoid CNS depressants.    HEENT:  Oral care.     PULMONARY:  HOB @ 45 degree.     CARDIOVASCULAR:  - Follow up echocardiogram   -  trops negative x2    GI: GI prophylaxis.                      RENAL:  HAGMA due to DKA  - monitor for hypokalemia and hypophosphatemia    INFECTIOUS DISEASE: COVID PNA  mild disease, no signs of CRS  manifestations: PNA   - follow up inflammatory markers (procal, crp, ferritin)   - patient spo2=98% on room air  - no remdesivir given onset of symptoms   - no indication for dexamethasone unless repiratory status worsening  - Low suspicion for concomitant bacterial PNA so will hold AN-B  - Lovenox 40 daily   -requires O2 to maintain sat, start dexa q24hrs    HEMATOLOGICAL:  DVT prophylaxis.     ENDOCRINE:  DKA  B-OH=5, AG=22  - BMP around the clock. Monitor anion gap, monitor K, phos  - FS every 1 hr when on insulin drip   - once patient tolerates PO diet then switch to long acting insulin and then stop   - was on insulin drip, now anion gap closed, start PO diet, bridge to Subq Insulin, switch IV fluid to D5 1/2 NS  - hba1c for tomorrow

## 2021-01-04 NOTE — RESEARCH COMMUNICATION NOTE - NS AS RESEARCH COMMUNICATION NOTE FT
Patient meets eligibility for HepCovid trial. An informed consent was obtained and will be kept in the research chart for the patient: Therapeutic vs Prophylactic     A new order for LOVENOX (research dose) will be placed and the dose will be available from [facility].     If you have any Qs you can call Meenakshi (Research Coordinator) on [phone] or [phone]. You can also contact Dr Lema ([phone] or via TEAMS).

## 2021-01-04 NOTE — PROGRESS NOTE ADULT - SUBJECTIVE AND OBJECTIVE BOX
T H I S   I S    N O  T   A    F I N A L I Z E D   N O T MEERA BOLANOS  70y, Male  Allergy: No Known Allergies    Hospital Day: 1d    Patient seen and examined earlier today.     PMH/PSH:  PAST MEDICAL & SURGICAL HISTORY:  DM (diabetes mellitus)    No significant past surgical history        LAST 24-Hr EVENTS:    VITALS:  T(F): 98.7 (01-04-21 @ 20:14), Max: 99 (01-04-21 @ 16:00)  HR: 88 (01-04-21 @ 20:14)  BP: 135/74 (01-04-21 @ 20:14) (125/67 - 173/70)  RR: 25 (01-04-21 @ 20:14)  SpO2: 93% (01-04-21 @ 20:14) on 2L NC        TESTS & MEASUREMENTS:  Weight (Kg): 79.3 (01-03-21 @ 11:00)  BMI (kg/m2): 26.6 (01-04)    01-03-21 @ 07:01  -  01-04-21 @ 07:00  --------------------------------------------------------  IN: 1666 mL / OUT: 400 mL / NET: 1266 mL    01-04-21 @ 07:01  -  01-04-21 @ 21:27  --------------------------------------------------------  IN: 1391 mL / OUT: 600 mL / NET: 791 mL                            15.4   4.32  )-----------( 218      ( 04 Jan 2021 04:30 )             45.2     PT/INR - ( 04 Jan 2021 16:55 )   PT: 12.70 sec;   INR: 1.10 ratio           INR: 1.10 ratio (01-04-21 @ 16:55)    01-04    135  |  100  |  12  ----------------------------<  253<H>  3.8   |  24  |  0.6<L>    Ca    8.2<L>      04 Jan 2021 04:30  Phos  3.7     01-03  Mg     1.6     01-04    TPro  5.8<L>  /  Alb  2.7<L>  /  TBili  1.3<H>  /  DBili  x   /  AST  54<H>  /  ALT  39  /  AlkPhos  71  01-04    LIVER FUNCTIONS - ( 04 Jan 2021 04:30 )  Alb: 2.7 g/dL / Pro: 5.8 g/dL / ALK PHOS: 71 U/L / ALT: 39 U/L / AST: 54 U/L / GGT: x           CARDIAC MARKERS ( 03 Jan 2021 12:45 )  x     / <0.01 ng/mL / x     / x     / x      CARDIAC MARKERS ( 03 Jan 2021 00:16 )  x     / <0.01 ng/mL / x     / x     / x          Procalcitonin, Serum: 0.11 ng/mL (01-03-21 @ 00:16)    D-Dimer Assay, Quantitative: 1784 ng/mL DDU (01-03-21 @ 00:16)    Ferritin, Serum: 899 ng/mL (01-03-21 @ 00:16)    RADIOLOGY, ECG, & ADDITIONAL TESTS:  12 Lead ECG:   Ventricular Rate 82 BPM  Atrial Rate 82 BPM  P-R Interval 162 ms  QRS Duration 120 ms  Q-T Interval 390 ms  QTC Calculation(Bazett) 455 ms  P Axis 82 degrees  R Axis -69 degrees  T Axis 25 degrees     Normal sinus rhythm  Left anterior fascicular block  Septal infarct , age undetermined  Lateral injury pattern Possible  ST elevation, consider early repolarization, pericarditis, or injury  Abnormal ECG    Confirmed by ERIBERTO JARA MD ([id number]) on 1/3/2021 8:13:35 AM (01-03-21 @ 00:22)      MEDICATIONS:  MEDICATIONS  (STANDING):  dextrose 40% Gel 15 Gram(s) Oral once  dextrose 5%. 1000 milliLiter(s) (50 mL/Hr) IV Continuous <Continuous>  dextrose 5%. 1000 milliLiter(s) (100 mL/Hr) IV Continuous <Continuous>  dextrose 50% Injectable 25 Gram(s) IV Push once  dextrose 50% Injectable 12.5 Gram(s) IV Push once  dextrose 50% Injectable 25 Gram(s) IV Push once  enoxaparin Study Injectable () 1 Dose(s) SubCutaneous two times a day  glucagon  Injectable 1 milliGRAM(s) IntraMuscular once  insulin glargine Injectable (LANTUS) 20 Unit(s) SubCutaneous every morning  insulin lispro Injectable (ADMELOG) 7 Unit(s) SubCutaneous three times a day before meals  insulin regular Infusion 2 Unit(s)/Hr (2 mL/Hr) IV Continuous <Continuous>  sodium chloride 0.9%. 1000 milliLiter(s) (75 mL/Hr) IV Continuous <Continuous>        PHYSICAL EXAM:  GENERAL: fatigued but A&Ox3  NECK: No swelling  CHEST/LUNG: Clear to auscultation bilaterally   HEART: S1S2  ABDOMEN: bs+ Non tender  EXTREMITIES:  No clubbing         MEERA RADER  70y, Male  Allergy: No Known Allergies    Hospital Day: 1d    Patient seen and examined earlier today.     PMH/PSH:  DM (diabetes mellitus) reported to be on insulin     No significant past surgical history    LAST 24-Hr EVENTS:  NO events documented    VITALS:  T(F): 98.7 (01-04-21 @ 20:14), Max: 99 (01-04-21 @ 16:00)  HR: 88 (01-04-21 @ 20:14)  BP: 135/74 (01-04-21 @ 20:14) (125/67 - 173/70)  RR: 25 (01-04-21 @ 20:14)  SpO2: 93% (01-04-21 @ 20:14) on 2L NC        TESTS & MEASUREMENTS:  Weight (Kg): 79.3 (01-03-21 @ 11:00)  BMI (kg/m2): 26.6 (01-04)    01-03-21 @ 07:01  -  01-04-21 @ 07:00  --------------------------------------------------------  IN: 1666 mL / OUT: 400 mL / NET: 1266 mL    01-04-21 @ 07:01  -  01-04-21 @ 21:27  --------------------------------------------------------  IN: 1391 mL / OUT: 600 mL / NET: 791 mL                            15.4   4.32  )-----------( 218      ( 04 Jan 2021 04:30 )             45.2     PT/INR - ( 04 Jan 2021 16:55 )   PT: 12.70 sec;   INR: 1.10 ratio           INR: 1.10 ratio (01-04-21 @ 16:55)    01-04    135  |  100  |  12  ----------------------------<  253<H>  3.8   |  24  |  0.6<L>    Ca    8.2<L>      04 Jan 2021 04:30  Phos  3.7     01-03  Mg     1.6     01-04    TPro  5.8<L>  /  Alb  2.7<L>  /  TBili  1.3<H>  /  DBili  x   /  AST  54<H>  /  ALT  39  /  AlkPhos  71  01-04    LIVER FUNCTIONS - ( 04 Jan 2021 04:30 )  Alb: 2.7 g/dL / Pro: 5.8 g/dL / ALK PHOS: 71 U/L / ALT: 39 U/L / AST: 54 U/L / GGT: x           CARDIAC MARKERS ( 03 Jan 2021 12:45 )  x     / <0.01 ng/mL / x     / x     / x      CARDIAC MARKERS ( 03 Jan 2021 00:16 )  x     / <0.01 ng/mL / x     / x     / x          Procalcitonin, Serum: 0.11 ng/mL (01-03-21 @ 00:16)    Beta Hydroxy-Butyrate: 5.5 mmoL/L (01.03.21 @ 05:48)      D-Dimer Assay, Quantitative: 1784 ng/mL DDU (01-03-21 @ 00:16)    Ferritin, Serum: 899 ng/mL (01-03-21 @ 00:16)    RADIOLOGY, ECG, & ADDITIONAL TESTS:  12 Lead ECG:   Ventricular Rate 82 BPM  Atrial Rate 82 BPM  P-R Interval 162 ms  QRS Duration 120 ms  Q-T Interval 390 ms  QTC Calculation(Bazett) 455 ms  P Axis 82 degrees  R Axis -69 degrees  T Axis 25 degrees     Normal sinus rhythm  Left anterior fascicular block  Septal infarct , age undetermined  Lateral injury pattern Possible  ST elevation, consider early repolarization, pericarditis, or injury  Abnormal ECG    Confirmed by ERIBERTO JARA MD ([id number]) on 1/3/2021 8:13:35 AM (01-03-21 @ 00:22)      < from: CT Angio Chest PE Protocol w/ IV Cont (01.03.21 @ 07:58) >  No pulmonary embolus.  Scattered bilateral peripheral groundglass opacities, compatible with infectious/inflammatory etiology (Covid pneumonia).  Multiple bilateral pulmonary nodules measuring up to 6 mm in the left upper lobe. Recommend a 12 month follow-up chest CT.      MEDICATIONS:  MEDICATIONS  (STANDING):  dextrose 40% Gel 15 Gram(s) Oral once  dextrose 5%. 1000 milliLiter(s) (50 mL/Hr) IV Continuous <Continuous>  dextrose 5%. 1000 milliLiter(s) (100 mL/Hr) IV Continuous <Continuous>  dextrose 50% Injectable 25 Gram(s) IV Push once  dextrose 50% Injectable 12.5 Gram(s) IV Push once  dextrose 50% Injectable 25 Gram(s) IV Push once  enoxaparin Study Injectable () 1 Dose(s) SubCutaneous two times a day  glucagon  Injectable 1 milliGRAM(s) IntraMuscular once  insulin glargine Injectable (LANTUS) 20 Unit(s) SubCutaneous every morning  insulin lispro Injectable (ADMELOG) 7 Unit(s) SubCutaneous three times a day before meals  insulin regular Infusion 2 Unit(s)/Hr (2 mL/Hr) IV Continuous <Continuous>  sodium chloride 0.9%. 1000 milliLiter(s) (75 mL/Hr) IV Continuous <Continuous>        PHYSICAL EXAM:  GENERAL: fatigued but A&Ox3  NECK: No swelling  CHEST/LUNG: Clear to auscultation bilaterally   HEART: S1S2  ABDOMEN: bs+ Non tender  EXTREMITIES:  No clubbing         MEERA RADER  70y, Male  Allergy: No Known Allergies    Hospital Day: 1d    Patient seen and examined earlier today.     PMH/PSH:  DM (diabetes mellitus) reported to be on insulin     No significant past surgical history    LAST 24-Hr EVENTS:  NO events documented    VITALS:  T(F): 98.7 (01-04-21 @ 20:14), Max: 99 (01-04-21 @ 16:00)  HR: 88 (01-04-21 @ 20:14)  BP: 135/74 (01-04-21 @ 20:14) (125/67 - 173/70)  RR: 25 (01-04-21 @ 20:14)  SpO2: 93% (01-04-21 @ 20:14) on 2L NC        TESTS & MEASUREMENTS:  Weight (Kg): 79.3 (01-03-21 @ 11:00)  BMI (kg/m2): 26.6 (01-04)    01-03-21 @ 07:01  -  01-04-21 @ 07:00  --------------------------------------------------------  IN: 1666 mL / OUT: 400 mL / NET: 1266 mL    01-04-21 @ 07:01  -  01-04-21 @ 21:27  --------------------------------------------------------  IN: 1391 mL / OUT: 600 mL / NET: 791 mL                            15.4   4.32  )-----------( 218      ( 04 Jan 2021 04:30 )             45.2     PT/INR - ( 04 Jan 2021 16:55 )   PT: 12.70 sec;   INR: 1.10 ratio           INR: 1.10 ratio (01-04-21 @ 16:55)    01-04    135  |  100  |  12  ----------------------------<  253<H>  3.8   |  24  |  0.6<L>    Anion Gap, Serum: 22 mmol/L (01.03.21 @ 00:16)  Anion Gap, Serum: 11 mmol/L (01.04.21 @ 04:30)      Ca    8.2<L>      04 Jan 2021 04:30  Phos  3.7     01-03  Mg     1.6     01-04    TPro  5.8<L>  /  Alb  2.7<L>  /  TBili  1.3<H>  /  DBili  x   /  AST  54<H>  /  ALT  39  /  AlkPhos  71  01-04    LIVER FUNCTIONS - ( 04 Jan 2021 04:30 )  Alb: 2.7 g/dL / Pro: 5.8 g/dL / ALK PHOS: 71 U/L / ALT: 39 U/L / AST: 54 U/L / GGT: x           CARDIAC MARKERS ( 03 Jan 2021 12:45 )  x     / <0.01 ng/mL / x     / x     / x      CARDIAC MARKERS ( 03 Jan 2021 00:16 )  x     / <0.01 ng/mL / x     / x     / x          Procalcitonin, Serum: 0.11 ng/mL (01-03-21 @ 00:16)    Beta Hydroxy-Butyrate: 5.5 mmoL/L (01.03.21 @ 05:48)      D-Dimer Assay, Quantitative: 1784 ng/mL DDU (01-03-21 @ 00:16)    Ferritin, Serum: 899 ng/mL (01-03-21 @ 00:16)    RADIOLOGY, ECG, & ADDITIONAL TESTS:  12 Lead ECG:   Ventricular Rate 82 BPM  Atrial Rate 82 BPM  P-R Interval 162 ms  QRS Duration 120 ms  Q-T Interval 390 ms  QTC Calculation(Bazett) 455 ms  P Axis 82 degrees  R Axis -69 degrees  T Axis 25 degrees     Normal sinus rhythm  Left anterior fascicular block  Septal infarct , age undetermined  Lateral injury pattern Possible  ST elevation, consider early repolarization, pericarditis, or injury  Abnormal ECG    Confirmed by ERIBERTO JARA MD ([id number]) on 1/3/2021 8:13:35 AM (01-03-21 @ 00:22)      < from: CT Angio Chest PE Protocol w/ IV Cont (01.03.21 @ 07:58) >  No pulmonary embolus.  Scattered bilateral peripheral groundglass opacities, compatible with infectious/inflammatory etiology (Covid pneumonia).  Multiple bilateral pulmonary nodules measuring up to 6 mm in the left upper lobe. Recommend a 12 month follow-up chest CT.      MEDICATIONS:  MEDICATIONS  (STANDING):  dextrose 40% Gel 15 Gram(s) Oral once  dextrose 5%. 1000 milliLiter(s) (50 mL/Hr) IV Continuous <Continuous>  dextrose 5%. 1000 milliLiter(s) (100 mL/Hr) IV Continuous <Continuous>  dextrose 50% Injectable 25 Gram(s) IV Push once  dextrose 50% Injectable 12.5 Gram(s) IV Push once  dextrose 50% Injectable 25 Gram(s) IV Push once  enoxaparin Study Injectable () 1 Dose(s) SubCutaneous two times a day  glucagon  Injectable 1 milliGRAM(s) IntraMuscular once  insulin glargine Injectable (LANTUS) 20 Unit(s) SubCutaneous every morning  insulin lispro Injectable (ADMELOG) 7 Unit(s) SubCutaneous three times a day before meals  insulin regular Infusion 2 Unit(s)/Hr (2 mL/Hr) IV Continuous <Continuous>  sodium chloride 0.9%. 1000 milliLiter(s) (75 mL/Hr) IV Continuous <Continuous>        PHYSICAL EXAM:  GENERAL: fatigued but A&Ox3  NECK: No swelling  CHEST/LUNG: Clear to auscultation bilaterally   HEART: S1S2  ABDOMEN: bs+ Non tender  EXTREMITIES:  No clubbing

## 2021-01-04 NOTE — PROGRESS NOTE ADULT - ASSESSMENT
·	Anorexia/decreased po intake  ·	Suspected DKA  ·	Possible cardiac/pericardiac involvement  ·	 ·	Anorexia/decreased po intake  ·	Suspected DKA  ·	Evidence of evolving COVID19 pneumonia   ·	Possible cardiac/pericardiac involvement  ·	Incidental findings of pulm nodules to be Follow up as outpatient    PLAN  ·	O2 support to keep SpO2 at rest around 90-96% at rest.   ·	Insulin protocol  ·	Anorexia/decreased po intake related to DKA  ·	Improved DKA (elevated Hydroxy-B on admission) with currently resolved high-anion gap acidemia; subsequent to COVID19 infection  ·	Evidence of evolving COVID19 pneumonia   ·	Possible cardiac/pericardiac involvement  ·	Incidental findings of pulm nodules to be Follow up as outpatient  ·	Severe COVID19 infection with evidence of underlying inflammatory cytokine storm     PLAN  ·	O2 support to keep SpO2 at rest around 90-96% at rest.   ·	Insulin protocol (currently switched to SQ)  ·	Endo and cardiology eval requested by Pulmonary & Critical Care Medicine team   ·	Patent at high risk for VTE ( D-Dimers blood test >1000) with negative PE work-up on Low-Molecular Weight Heparin (Lovenox) according to Hep-COVID clinical trial (prophylactic vs therapeutic dose)     At high risk for complications due to current acute illness ( DKA + COVID19) and underlying advanced age and DM.     Case discussed at length with multidisciplinary team on rounds.

## 2021-01-04 NOTE — PROGRESS NOTE ADULT - SUBJECTIVE AND OBJECTIVE BOX
SUBJECTIVE:    Patient is a 70y old Male who presents with a chief complaint of weakness (03 Jan 2021 10:23)    Currently admitted to medicine with the primary diagnosis of DKA (diabetic ketoacidoses)       Today is hospital day 1d. This morning he is resting comfortably in bed and reports no new issues or overnight events.     INTERVAL EVENTS:   anion gap closed this am. started on PO diet. will bridge the patient to subq insulin    PAST MEDICAL & SURGICAL HISTORY  DM (diabetes mellitus)    No significant past surgical history        ALLERGIES:  No Known Allergies    MEDICATIONS:  STANDING MEDICATIONS  dextrose 40% Gel 15 Gram(s) Oral once  dextrose 5% + sodium chloride 0.9% 1000 milliLiter(s) IV Continuous <Continuous>  dextrose 5%. 1000 milliLiter(s) IV Continuous <Continuous>  dextrose 5%. 1000 milliLiter(s) IV Continuous <Continuous>  dextrose 50% Injectable 25 Gram(s) IV Push once  dextrose 50% Injectable 12.5 Gram(s) IV Push once  dextrose 50% Injectable 25 Gram(s) IV Push once  enoxaparin Injectable 40 milliGRAM(s) SubCutaneous at bedtime  glucagon  Injectable 1 milliGRAM(s) IntraMuscular once  insulin glargine Injectable (LANTUS) 20 Unit(s) SubCutaneous every morning  insulin lispro Injectable (ADMELOG) 7 Unit(s) SubCutaneous three times a day before meals  insulin regular Infusion 2 Unit(s)/Hr IV Continuous <Continuous>    PRN MEDICATIONS    VITALS:   T(F): 97.7  HR: 87  BP: 140/94  RR: 24  SpO2: 99%    LABS:                        15.5   3.72  )-----------( 204      ( 03 Jan 2021 12:45 )             46.0     01-03    136  |  100  |  14  ----------------------------<  54<LL>  4.0   |  24  |  0.5<L>    Ca    8.3<L>      03 Jan 2021 23:30  Phos  3.7     01-03  Mg     2.0     01-03    TPro  6.0  /  Alb  3.0<L>  /  TBili  0.7  /  DBili  x   /  AST  48<H>  /  ALT  38  /  AlkPhos  70  01-03          Troponin T, Serum: <0.01 ng/mL (01-03-21 @ 12:45)      CARDIAC MARKERS ( 03 Jan 2021 12:45 )  x     / <0.01 ng/mL / x     / x     / x      CARDIAC MARKERS ( 03 Jan 2021 00:16 )  x     / <0.01 ng/mL / x     / x     / x          RADIOLOGY:    PHYSICAL EXAM:  GEN: No acute distress  PULM/CHEST: Clear to auscultation bilaterally, no rales, rhonchi or wheezes   CVS: Regular rate and rhythm, S1-S2, no murmurs  ABD: Soft, non-tender, non-distended, +BS  EXT: No edema  NEURO: AAOx3    Aguirre Catheter:

## 2021-01-04 NOTE — CHART NOTE - NSCHARTNOTEFT_GEN_A_CORE
Called patients emergency contact Lena and gave him a clinical update. Informed him that patient is stable in terms of his labs and vitals. His DKA is resolved and hopefully by tomorrow we will look to downgrade him from CEU. Answered all questions and will update if any clinical changes.

## 2021-01-04 NOTE — CHART NOTE - NSCHARTNOTEFT_GEN_A_CORE
MICU Transfer Note    MEERA RADER  70y (1950)  [id number]      Transfer from: CEU  Transfer to: Medicine      MICU COURSE:  70 year old male with a PMhx of DMII, PUD.     CC: weakness and decreased PO intake    History goes back to: 2 days ago when patient started having decreased PO intake, and started feeling very fatigued. Patients weakness got worse and so he presented to the ED    ROS is positive for: patient reports chills at home, pleuritic chest pain (only on coughing and taking a deep breath). Additionally patient reports some diffuse abdominal pain, and mild nausea.     ROS is negative for: no SOB, no palpitations. Patient denies change in urinary or bowel habits. No lower extremity edema /swelling     Of note: patient was diagnosed with COVID almost 10 days ago and he was feeling okay until 2 days ago.     In the ED: vital signs WNL  Labs significant for: d-dimer 1700, b-oh:5, glucose: 361  Troponin: negative  CT A chest: No pulmonary embolus.  Scattered peripheral ground - glass opacities, compatible with viral/infectious etiology.  Multiple bilateral pulmonary nodules measuring up to 6 mm in the left upper lobe.  Patient admitted for DKA.    In the CEU patient was placed on insulin drip. BMP around the clock. Monitor anion gap, monitor K, phos. FS every 1 hr when on insulin drip, now anion gap closed, start PO diet, bridge to Subq Insulin, switch IV fluid to maintenance fluid, downgrade to the floor    IMPRESSION:  #DKA - precipitated by viral infection   #HAGMA   # possible pericarditis?  #COVID 19 PNA - mild       PLAN:    CNS: Avoid CNS depressants.    HEENT:  Oral care.     PULMONARY:  HOB @ 45 degree.     CARDIOVASCULAR:  - Follow up echocardiogram   -  trops negative x2    GI: GI prophylaxis.                      RENAL:  HAGMA due to DKA  - monitor for hypokalemia and hypophosphatemia    INFECTIOUS DISEASE: COVID PNA  mild disease, no signs of CRS  manifestations: PNA   - follow up inflammatory markers (procal, crp, ferritin)   - patient spo2=98% on room air  - no remdesivir given onset of symptoms   - no indication for dexamethasone unless repiratory status worsening  - Low suspicion for concomitant bacterial PNA so will hold AN-B  - Lovenox 40 daily   -requires O2 to maintain sat, start dexa q24hrs    HEMATOLOGICAL:  DVT prophylaxis.     ENDOCRINE:  DKA  B-OH=5, AG=22  - BMP around the clock. Monitor anion gap, monitor K, phos  - FS every 1 hr when on insulin drip   - was on insulin drip, now anion gap closed, start PO diet, bridge to Subq Insulin, switch IV fluid to D5 1/2 NS      for Follow up  -finger sitcks  -hb a1c  -inflammatory markers    PAST MEDICAL & SURGICAL HISTORY:  DM (diabetes mellitus)    No significant past surgical history      Allergies    No Known Allergies    Intolerances      MEDICATIONS  (STANDING):  dextrose 40% Gel 15 Gram(s) Oral once  dextrose 5% + sodium chloride 0.9% 1000 milliLiter(s) (100 mL/Hr) IV Continuous <Continuous>  dextrose 5%. 1000 milliLiter(s) (50 mL/Hr) IV Continuous <Continuous>  dextrose 5%. 1000 milliLiter(s) (100 mL/Hr) IV Continuous <Continuous>  dextrose 50% Injectable 25 Gram(s) IV Push once  dextrose 50% Injectable 12.5 Gram(s) IV Push once  dextrose 50% Injectable 25 Gram(s) IV Push once  enoxaparin Injectable 40 milliGRAM(s) SubCutaneous at bedtime  glucagon  Injectable 1 milliGRAM(s) IntraMuscular once  insulin glargine Injectable (LANTUS) 20 Unit(s) SubCutaneous every morning  insulin lispro Injectable (ADMELOG) 7 Unit(s) SubCutaneous three times a day before meals  insulin regular Infusion 2 Unit(s)/Hr (2 mL/Hr) IV Continuous <Continuous>  magnesium sulfate  IVPB 2 Gram(s) IV Intermittent once    MEDICATIONS  (PRN):      Vital Signs Last 24 Hrs  T(C): 36.5 (04 Jan 2021 08:00), Max: 37 (03 Jan 2021 16:40)  T(F): 97.7 (04 Jan 2021 08:00), Max: 98.6 (03 Jan 2021 16:40)  HR: 91 (04 Jan 2021 09:00) (76 - 113)  BP: 156/74 (04 Jan 2021 09:00) (132/72 - 173/70)  BP(mean): 109 (04 Jan 2021 09:00) (95 - 118)  RR: 26 (04 Jan 2021 09:00) (16 - 26)  SpO2: 99% (04 Jan 2021 09:00) (91% - 100%)  I&O's Summary    03 Jan 2021 07:01  -  04 Jan 2021 07:00  --------------------------------------------------------  IN: 1666 mL / OUT: 400 mL / NET: 1266 mL    04 Jan 2021 07:01  -  04 Jan 2021 09:27  --------------------------------------------------------  IN: 104 mL / OUT: 200 mL / NET: -96 mL        LABS  LABS:  CAPILLARY BLOOD GLUCOSE      POCT Blood Glucose.: 224 mg/dL (04 Jan 2021 09:03)  POCT Blood Glucose.: 215 mg/dL (04 Jan 2021 08:03)  POCT Blood Glucose.: 218 mg/dL (04 Jan 2021 07:27)  POCT Blood Glucose.: 193 mg/dL (04 Jan 2021 06:01)  POCT Blood Glucose.: 143 mg/dL (04 Jan 2021 03:46)  POCT Blood Glucose.: 154 mg/dL (04 Jan 2021 02:06)  POCT Blood Glucose.: 160 mg/dL (04 Jan 2021 01:06)  POCT Blood Glucose.: 169 mg/dL (03 Jan 2021 22:31)  POCT Blood Glucose.: 182 mg/dL (03 Jan 2021 20:57)  POCT Blood Glucose.: 182 mg/dL (03 Jan 2021 20:08)  POCT Blood Glucose.: 195 mg/dL (03 Jan 2021 18:59)  POCT Blood Glucose.: 180 mg/dL (03 Jan 2021 18:17)  POCT Blood Glucose.: 209 mg/dL (03 Jan 2021 17:23)  POCT Blood Glucose.: 195 mg/dL (03 Jan 2021 16:23)  POCT Blood Glucose.: 192 mg/dL (03 Jan 2021 15:03)  POCT Blood Glucose.: 206 mg/dL (03 Jan 2021 14:01)  POCT Blood Glucose.: 103 mg/dL (03 Jan 2021 12:59)  POCT Blood Glucose.: 78 mg/dL (03 Jan 2021 11:48)                          15.4   4.32  )-----------( 218      ( 04 Jan 2021 04:30 )             45.2       01-04    135  |  100  |  12  ----------------------------<  253<H>  3.8   |  24  |  0.6<L>    Ca    8.2<L>      04 Jan 2021 04:30  Phos  3.7     01-03  Mg     1.6     01-04    TPro  5.8<L>  /  Alb  2.7<L>  /  TBili  1.3<H>  /  DBili  x   /  AST  54<H>  /  ALT  39  /  AlkPhos  71  01-04        CARDIAC MARKERS ( 03 Jan 2021 12:45 )  x     / <0.01 ng/mL / x     / x     / x      CARDIAC MARKERS ( 03 Jan 2021 00:16 )  x     / <0.01 ng/mL / x     / x     / x                Signed out to:  Date:  Time:

## 2021-01-05 LAB
A1C WITH ESTIMATED AVERAGE GLUCOSE RESULT: 11.9 % — HIGH (ref 4–5.6)
ALBUMIN SERPL ELPH-MCNC: 3 G/DL — LOW (ref 3.5–5.2)
ALP SERPL-CCNC: 77 U/L — SIGNIFICANT CHANGE UP (ref 30–115)
ALT FLD-CCNC: 43 U/L — HIGH (ref 0–41)
ANION GAP SERPL CALC-SCNC: 15 MMOL/L — HIGH (ref 7–14)
AST SERPL-CCNC: 48 U/L — HIGH (ref 0–41)
AT III ACT/NOR PPP CHRO: 94 % — SIGNIFICANT CHANGE UP (ref 85–135)
BILIRUB SERPL-MCNC: 1.1 MG/DL — SIGNIFICANT CHANGE UP (ref 0.2–1.2)
BUN SERPL-MCNC: 11 MG/DL — SIGNIFICANT CHANGE UP (ref 10–20)
CALCIUM SERPL-MCNC: 8.2 MG/DL — LOW (ref 8.5–10.1)
CHLORIDE SERPL-SCNC: 97 MMOL/L — LOW (ref 98–110)
CO2 SERPL-SCNC: 25 MMOL/L — SIGNIFICANT CHANGE UP (ref 17–32)
CREAT SERPL-MCNC: 0.5 MG/DL — LOW (ref 0.7–1.5)
CRP SERPL-MCNC: 3.22 MG/DL — HIGH (ref 0–0.4)
D DIMER BLD IA.RAPID-MCNC: 848 NG/ML DDU — HIGH (ref 0–230)
ESTIMATED AVERAGE GLUCOSE: 295 MG/DL — HIGH (ref 68–114)
FERRITIN SERPL-MCNC: 783 NG/ML — HIGH (ref 30–400)
GLUCOSE BLDC GLUCOMTR-MCNC: 133 MG/DL — HIGH (ref 70–99)
GLUCOSE BLDC GLUCOMTR-MCNC: 224 MG/DL — HIGH (ref 70–99)
GLUCOSE BLDC GLUCOMTR-MCNC: 264 MG/DL — HIGH (ref 70–99)
GLUCOSE BLDC GLUCOMTR-MCNC: 284 MG/DL — HIGH (ref 70–99)
GLUCOSE SERPL-MCNC: 284 MG/DL — HIGH (ref 70–99)
HCT VFR BLD CALC: 45.7 % — SIGNIFICANT CHANGE UP (ref 42–52)
HGB BLD-MCNC: 15.6 G/DL — SIGNIFICANT CHANGE UP (ref 14–18)
MAGNESIUM SERPL-MCNC: 1.9 MG/DL — SIGNIFICANT CHANGE UP (ref 1.8–2.4)
MCHC RBC-ENTMCNC: 30.5 PG — SIGNIFICANT CHANGE UP (ref 27–31)
MCHC RBC-ENTMCNC: 34.1 G/DL — SIGNIFICANT CHANGE UP (ref 32–37)
MCV RBC AUTO: 89.3 FL — SIGNIFICANT CHANGE UP (ref 80–94)
NRBC # BLD: 0 /100 WBCS — SIGNIFICANT CHANGE UP (ref 0–0)
PHOSPHATE SERPL-MCNC: 2.8 MG/DL — SIGNIFICANT CHANGE UP (ref 2.1–4.9)
PLATELET # BLD AUTO: 237 K/UL — SIGNIFICANT CHANGE UP (ref 130–400)
POTASSIUM SERPL-MCNC: 3.7 MMOL/L — SIGNIFICANT CHANGE UP (ref 3.5–5)
POTASSIUM SERPL-SCNC: 3.7 MMOL/L — SIGNIFICANT CHANGE UP (ref 3.5–5)
PROT S FREE AG PPP IA-ACNC: 23 % — LOW (ref 67–141)
PROT SERPL-MCNC: 6.2 G/DL — SIGNIFICANT CHANGE UP (ref 6–8)
RBC # BLD: 5.12 M/UL — SIGNIFICANT CHANGE UP (ref 4.7–6.1)
RBC # FLD: 12.8 % — SIGNIFICANT CHANGE UP (ref 11.5–14.5)
SODIUM SERPL-SCNC: 137 MMOL/L — SIGNIFICANT CHANGE UP (ref 135–146)
WBC # BLD: 4.81 K/UL — SIGNIFICANT CHANGE UP (ref 4.8–10.8)
WBC # FLD AUTO: 4.81 K/UL — SIGNIFICANT CHANGE UP (ref 4.8–10.8)

## 2021-01-05 PROCEDURE — 71045 X-RAY EXAM CHEST 1 VIEW: CPT | Mod: 26

## 2021-01-05 PROCEDURE — 99232 SBSQ HOSP IP/OBS MODERATE 35: CPT

## 2021-01-05 PROCEDURE — 93970 EXTREMITY STUDY: CPT | Mod: 26

## 2021-01-05 RX ORDER — PANTOPRAZOLE SODIUM 20 MG/1
40 TABLET, DELAYED RELEASE ORAL ONCE
Refills: 0 | Status: COMPLETED | OUTPATIENT
Start: 2021-01-05 | End: 2021-01-05

## 2021-01-05 RX ORDER — METOCLOPRAMIDE HCL 10 MG
10 TABLET ORAL THREE TIMES A DAY
Refills: 0 | Status: DISCONTINUED | OUTPATIENT
Start: 2021-01-05 | End: 2021-01-10

## 2021-01-05 RX ORDER — AMLODIPINE BESYLATE 2.5 MG/1
5 TABLET ORAL DAILY
Refills: 0 | Status: DISCONTINUED | OUTPATIENT
Start: 2021-01-05 | End: 2021-01-10

## 2021-01-05 RX ORDER — PANTOPRAZOLE SODIUM 20 MG/1
40 TABLET, DELAYED RELEASE ORAL
Refills: 0 | Status: DISCONTINUED | OUTPATIENT
Start: 2021-01-06 | End: 2021-01-10

## 2021-01-05 RX ADMIN — AMLODIPINE BESYLATE 5 MILLIGRAM(S): 2.5 TABLET ORAL at 10:34

## 2021-01-05 RX ADMIN — Medication 10 MILLIGRAM(S): at 13:25

## 2021-01-05 RX ADMIN — Medication 7 UNIT(S): at 11:39

## 2021-01-05 RX ADMIN — INSULIN GLARGINE 20 UNIT(S): 100 INJECTION, SOLUTION SUBCUTANEOUS at 08:40

## 2021-01-05 RX ADMIN — Medication 7 UNIT(S): at 08:39

## 2021-01-05 RX ADMIN — Medication 5 MILLIGRAM(S): at 10:34

## 2021-01-05 RX ADMIN — Medication 7 UNIT(S): at 17:15

## 2021-01-05 RX ADMIN — Medication 10 MILLIGRAM(S): at 21:49

## 2021-01-05 RX ADMIN — Medication 10 MILLIGRAM(S): at 08:25

## 2021-01-05 RX ADMIN — PANTOPRAZOLE SODIUM 40 MILLIGRAM(S): 20 TABLET, DELAYED RELEASE ORAL at 13:53

## 2021-01-05 NOTE — PROGRESS NOTE ADULT - ATTENDING COMMENTS
I have personally seen and examined this patient.  I have fully participated in the care of this patient.  I have reviewed pertinent clinical information, including history, physical exam, plan and note.   I have reviewed relevant imaging and diagnostic studies personally. I agree with resident note above and plan of care, edited and corrected where applicable.     . COVID19 infection with limited pulm involvement on NC 2L  . DKA resolving; likely due to underlying COVID19 infection  . Hiccups/ advanced diabetic gastropathy/ dyspepsia   . Uncontrolled HTN  . Profound weakness; likely due to covid 19   '  PLAN  .. O2 support to keep SpO2 at rest around 90-96% at rest.   .. On DVT prophylaxis (enrolled in Lovenox clinical trial) for elevated DDimers on presentation  .. Insulin regime/ uncontrolled A1C; needs close endocrine f/u as outpatient (patient counseled)  .. May resume outpatient  ACE-I; may add low dose Calcium Channel Blocker while inpatient   .. Incidental findings on CT scan (chest): pulm nodules; patient advised to f/u as outpatient   .. No clinical symptoms of pericarditis at this time; echo access as inpatient restricted due to COVID19 pandemic    Progress Note Handoff  Pending:  clinical improvement/ reliable intake/ For outpatient DVT Prophylaxis regimen at the time of discharge, please refer to the most current guidelines regarding Extended DVT Prophylaxis for COVID19 patients: SUNRISE ---> TOOLS (top bar) --->COV19 Treatement Protocols.     Disposition: Likely home

## 2021-01-05 NOTE — PROGRESS NOTE ADULT - SUBJECTIVE AND OBJECTIVE BOX
SUBJECTIVE:    Patient is a 70y old Male who presents with a chief complaint of weakness (05 Jan 2021 09:47)    Currently admitted to medicine with the primary diagnosis of DKA (diabetic ketoacidoses)      Today is hospital day 2d. This morning he is resting comfortably in bed and reports no new issues or overnight events.     PAST MEDICAL & SURGICAL HISTORY  DM (diabetes mellitus)    No significant past surgical history      SOCIAL HISTORY:  Negative for smoking/alcohol/drug use.     ALLERGIES:  No Known Allergies    MEDICATIONS:  STANDING MEDICATIONS  amLODIPine   Tablet 5 milliGRAM(s) Oral daily  dextrose 40% Gel 15 Gram(s) Oral once  dextrose 5%. 1000 milliLiter(s) IV Continuous <Continuous>  dextrose 5%. 1000 milliLiter(s) IV Continuous <Continuous>  dextrose 50% Injectable 25 Gram(s) IV Push once  dextrose 50% Injectable 12.5 Gram(s) IV Push once  dextrose 50% Injectable 25 Gram(s) IV Push once  enalapril 5 milliGRAM(s) Oral daily  enoxaparin Study Injectable () 1 Dose(s) SubCutaneous two times a day  glucagon  Injectable 1 milliGRAM(s) IntraMuscular once  insulin glargine Injectable (LANTUS) 20 Unit(s) SubCutaneous every morning  insulin lispro Injectable (ADMELOG) 7 Unit(s) SubCutaneous three times a day before meals  metoclopramide 10 milliGRAM(s) Oral three times a day    PRN MEDICATIONS      LABS:                        15.6   4.81  )-----------( 237      ( 05 Jan 2021 08:29 )             45.7     01-05    137  |  97<L>  |  11  ----------------------------<  284<H>  3.7   |  25  |  0.5<L>    Ca    8.2<L>      05 Jan 2021 08:29  Phos  2.8     01-05  Mg     1.9     01-05    TPro  6.2  /  Alb  3.0<L>  /  TBili  1.1  /  DBili  x   /  AST  48<H>  /  ALT  43<H>  /  AlkPhos  77  01-05    PT/INR - ( 04 Jan 2021 16:55 )   PT: 12.70 sec;   INR: 1.10 ratio                       RADIOLOGY:    VITALS:   T(F): 98, Max: 99 (01-04-21 @ 16:00)  HR: 90  BP: 148/70  RR: 17  SpO2: 96%    PHYSICAL EXAM:  GEN: No acute distress  LUNGS: Clear to auscultation bilaterally, no wheezes rhonchi or rales  HEART: Regular rate and rhythm, S1, S2 auscultated, no murmurs, rubs, or gallops  ABD: Soft, tender to palpation 6/10 in the LLQ, no radiation, non-distended.  EXT: No edema, no pallor,   NEURO: AAOX3    Intravenous access:   NG tube:   Aguirre Catheter:        SUBJECTIVE:    Patient is a 70y old Male who presents with a chief complaint of weakness (05 Jan 2021 09:47)    Currently admitted to medicine with the primary diagnosis of DKA (diabetic ketoacidoses)    Today is hospital day 2d. This morning he is resting comfortably in bed and reports no new issues or overnight events.     PAST MEDICAL & SURGICAL HISTORY  DM (diabetes mellitus)    No significant past surgical history      SOCIAL HISTORY:  Negative for smoking/alcohol/drug use.     ALLERGIES:  No Known Allergies    MEDICATIONS:  STANDING MEDICATIONS  amLODIPine   Tablet 5 milliGRAM(s) Oral daily  dextrose 40% Gel 15 Gram(s) Oral once  dextrose 5%. 1000 milliLiter(s) IV Continuous <Continuous>  dextrose 5%. 1000 milliLiter(s) IV Continuous <Continuous>  dextrose 50% Injectable 25 Gram(s) IV Push once  dextrose 50% Injectable 12.5 Gram(s) IV Push once  dextrose 50% Injectable 25 Gram(s) IV Push once  enalapril 5 milliGRAM(s) Oral daily  enoxaparin Study Injectable () 1 Dose(s) SubCutaneous two times a day  glucagon  Injectable 1 milliGRAM(s) IntraMuscular once  insulin glargine Injectable (LANTUS) 20 Unit(s) SubCutaneous every morning  insulin lispro Injectable (ADMELOG) 7 Unit(s) SubCutaneous three times a day before meals  metoclopramide 10 milliGRAM(s) Oral three times a day    PRN MEDICATIONS      LABS:                        15.6   4.81  )-----------( 237      ( 05 Jan 2021 08:29 )             45.7     01-05    137  |  97<L>  |  11  ----------------------------<  284<H>  3.7   |  25  |  0.5<L>    Ca    8.2<L>      05 Jan 2021 08:29  Phos  2.8     01-05  Mg     1.9     01-05    TPro  6.2  /  Alb  3.0<L>  /  TBili  1.1  /  DBili  x   /  AST  48<H>  /  ALT  43<H>  /  AlkPhos  77  01-05    PT/INR - ( 04 Jan 2021 16:55 )   PT: 12.70 sec;   INR: 1.10 ratio                       RADIOLOGY:    VITALS:   T(F): 98, Max: 99 (01-04-21 @ 16:00)  HR: 90  BP: 148/70  RR: 17  SpO2: 96%    PHYSICAL EXAM:  GEN: No acute distress  LUNGS: Clear to auscultation bilaterally, no wheezes rhonchi or rales  HEART: Regular rate and rhythm, S1, S2 auscultated, no murmurs, rubs, or gallops  ABD: Soft, tender to palpation 6/10 in the LLQ, no radiation, non-distended.  EXT: No edema, no pallor,   NEURO: AAOX3    Intravenous access:   NG tube:   Aguirre Catheter:

## 2021-01-05 NOTE — RESEARCH COMMUNICATION NOTE - NS AS RESEARCH COMMUNICATION NOTE FT
This is a reminder that patient is currently enrolled in HepCOVID clinical trial and receiving a blinded (study) dose of Low-Molecular Weight Heparin (Lovenox). Kidney function and CBC values noted. No events related to the study drug reported. For any issue please reach out to the research department (Meenakshi [phone] or Teams) and Dr Lema ([phone] or Teams).

## 2021-01-05 NOTE — CHART NOTE - NSCHARTNOTEFT_GEN_A_CORE
Transfer Note    Transfer from: CEU  Transfer to:  ( x ) Medicine    (  ) Telemetry    (  ) RCU    (  ) Palliative    (  ) Stroke Unit    (  ) _______________    HPI:  70 year old male with a PMhx of DMII, PUD.     CC: weakness and decreased PO intake    History goes back to: 2 days ago when patient started having decreased PO intake, and started feeling very fatigued. Patients weakness got worse and so he presented to the ED    ROS is positive for: patient reports chills at home, pleuritic chest pain (only on coughing and taking a deep breath). Additionally patient reports some diffuse abdominal pain, and mild nausea.     ROS is negative for: no SOB, no palpitations. Patient denies change in urinary or bowel habits. No lower extremity edema /swelling     Of note: patient was diagnosed with COVID almost 10 days ago and he was feeling carrol until 2 days ago.     In the ED: vital signs WNL  Labs significant for: d-dimer 1700, b-oh:5, glucose: 361  Troponin: negative  CT A chest: No pulmonary embolus.  Scattered peripheral ground - glass opacities, compatible with viral/infectious etiology.  Multiple bilateral pulmonary nodules measuring up to 6 mm in the left upper lobe.  Patient admitted for DKA.      (03 Jan 2021 07:01)    CEU COURSE:  In the CEU patient was placed on insulin drip. BMP around the clock. Monitor anion gap, monitor K, phos. FS every 1 hr when on insulin drip, now anion gap closed, start PO diet, bridge to Subq Insulin, switch IV fluid to maintenance fluid. Noted to be Hypertensive, started on enalipril and amlodipine. No acute events on the unit. Pt stable to be downgraded to medicine.         ASSESSMENT & PLAN:     #DKA - precipitared by viral infection  - on admisson B-OH=5, AG=22  - started on insulin drip, AG closed, bridged to insulin sub q  - was on insulin drip, now anion gap closed, start PO diet, bridge to Subq Insulin, switch IV fluid to D5 1/2 NS  IMPRESSION:  #DKA - precipitated by viral infection   #HAGMA   # possible pericarditis?  #COVID 19 PNA - mild       PLAN:    CNS: Avoid CNS depressants.    HEENT:  Oral care.     PULMONARY:  HOB @ 45 degree.     CARDIOVASCULAR:  - Follow up echocardiogram   -  trops negative x2    GI: GI prophylaxis.                      RENAL:  HAGMA due to DKA  - monitor for hypokalemia and hypophosphatemia    INFECTIOUS DISEASE: COVID PNA  mild disease, no signs of CRS  manifestations: PNA   - follow up inflammatory markers (procal, crp, ferritin)   - patient spo2=98% on room air  - no remdesivir given onset of symptoms   - no indication for dexamethasone unless repiratory status worsening  - Low suspicion for concomitant bacterial PNA so will hold AN-B  - Lovenox 40 daily   -requires O2 to maintain sat, start dexa q24hrs    HEMATOLOGICAL:  DVT prophylaxis.     ENDOCRINE:  DKA  B-OH=5, AG=22  - BMP around the clock. Monitor anion gap, monitor K, phos  - FS every 1 hr when on insulin drip   - was on insulin drip, now anion gap closed, start PO diet, bridge to Subq Insulin, switch IV fluid to D5 1/2 NS      for Follow up  -finger sitcks  -hb a1c  -inflammatory markers        Vital Signs Last 24 Hrs  T(C): 36.7 (05 Jan 2021 12:00), Max: 37.2 (04 Jan 2021 16:00)  T(F): 98 (05 Jan 2021 12:00), Max: 99 (04 Jan 2021 16:00)  HR: 88 (05 Jan 2021 12:00) (78 - 103)  BP: 170/87 (05 Jan 2021 12:00) (125/67 - 203/100)  BP(mean): 121 (05 Jan 2021 12:00) (90 - 140)  RR: 22 (05 Jan 2021 12:00) (22 - 30)  SpO2: 94% (05 Jan 2021 12:00) (91% - 96%)  I&O's Summary    04 Jan 2021 07:01  -  05 Jan 2021 07:00  --------------------------------------------------------  IN: 1391 mL / OUT: 600 mL / NET: 791 mL    05 Jan 2021 07:01  -  05 Jan 2021 13:04  --------------------------------------------------------  IN: 0 mL / OUT: 700 mL / NET: -700 mL          MEDICATIONS  (STANDING):  amLODIPine   Tablet 5 milliGRAM(s) Oral daily  dextrose 40% Gel 15 Gram(s) Oral once  dextrose 5%. 1000 milliLiter(s) (50 mL/Hr) IV Continuous <Continuous>  dextrose 5%. 1000 milliLiter(s) (100 mL/Hr) IV Continuous <Continuous>  dextrose 50% Injectable 25 Gram(s) IV Push once  dextrose 50% Injectable 12.5 Gram(s) IV Push once  dextrose 50% Injectable 25 Gram(s) IV Push once  enalapril 5 milliGRAM(s) Oral daily  enoxaparin Study Injectable () 1 Dose(s) SubCutaneous two times a day  glucagon  Injectable 1 milliGRAM(s) IntraMuscular once  insulin glargine Injectable (LANTUS) 20 Unit(s) SubCutaneous every morning  insulin lispro Injectable (ADMELOG) 7 Unit(s) SubCutaneous three times a day before meals  metoclopramide 10 milliGRAM(s) Oral three times a day    MEDICATIONS  (PRN):        LABS                                            15.6                  Neurophils% (auto):   x      (01-05 @ 08:29):    4.81 )-----------(237          Lymphocytes% (auto):  x                                             45.7                   Eosinphils% (auto):   x        Manual%: Neutrophils x    ; Lymphocytes x    ; Eosinophils x    ; Bands%: x    ; Blasts x                                    137    |  97     |  11                  Calcium: 8.2   / iCa: x      (01-05 @ 08:29)    ----------------------------<  284       Magnesium: 1.9                              3.7     |  25     |  0.5              Phosphorous: 2.8      TPro  6.2    /  Alb  3.0    /  TBili  1.1    /  DBili  x      /  AST  48     /  ALT  43     /  AlkPhos  77     05 Jan 2021 08:29    ( 01-04 @ 16:55 )   PT: 12.70 sec;   INR: 1.10 ratio  aPTT: x Transfer Note    Transfer from: CEU  Transfer to:  ( x ) Medicine    (  ) Telemetry    (  ) RCU    (  ) Palliative    (  ) Stroke Unit    (  ) _______________    HPI:  70 year old male with a PMhx of DMII, PUD.     CC: weakness and decreased PO intake    History goes back to: 2 days ago when patient started having decreased PO intake, and started feeling very fatigued. Patients weakness got worse and so he presented to the ED    ROS is positive for: patient reports chills at home, pleuritic chest pain (only on coughing and taking a deep breath). Additionally patient reports some diffuse abdominal pain, and mild nausea.     ROS is negative for: no SOB, no palpitations. Patient denies change in urinary or bowel habits. No lower extremity edema /swelling     Of note: patient was diagnosed with COVID almost 10 days ago and he was feeling carrol until 2 days ago.     In the ED: vital signs WNL  Labs significant for: d-dimer 1700, b-oh:5, glucose: 361  Troponin: negative  CT A chest: No pulmonary embolus.  Scattered peripheral ground - glass opacities, compatible with viral/infectious etiology.  Multiple bilateral pulmonary nodules measuring up to 6 mm in the left upper lobe.  Patient admitted for DKA.      (03 Jan 2021 07:01)    CEU COURSE:  In the CEU patient was placed on insulin drip. BMP around the clock. Monitor anion gap, monitor K, phos. FS every 1 hr when on insulin drip, now anion gap closed, start PO diet, bridge to Subq Insulin, switch IV fluid to maintenance fluid. Noted to be Hypertensive, started on enalipril and amlodipine. No acute events on the unit. Pt stable to be downgraded to medicine.         ASSESSMENT & PLAN:     #DM with DKA - precipitated by viral infection  - on admissions B-OH=5, AG=22  - started on insulin drip, AG closed, bridged to insulin sub q, started on PO diet  - a1c 11.9, fs still elevated in the 200s  - c/w FS q6hrs  - c/w lantus 20, lispro 7/7/7 with +1 sliding scale  - adjust accordingly     #COVID PNA  - CTA chest: Scattered bilateral peripheral groundglass opacities, compatible with infectious/inflammatory etiology (Covid pneumonia).  Multiple bilateral pulmonary nodules measuring up to 6 mm in the left upper lobe. Recommend a 12 month follow-up chest CT.  - follow up inflammatory markers (procal, crp, ferritin)   - no remdesivir given onset of symptoms   - Low suspicion for concomitant bacterial PNA so will hold AN-B  - c/w Lovenox 40 daily   - Was on RA at admission, now requires O2 (2L NC)  SpO2  - DDimer 848, Fibrinogen >700,   - Start dexa q24hrs  - if worsening, ID eval    #Lung nodules found on CT  - Meassuring 6mm in left  upper lobe  - f/u o/p in 12 months for f/u CT    # possible pericarditis?  - trops (-) x2  - f/u TTE    #Hx of Peptic ulcer  - takes celebrex at home, d/c home med  - has abdominal pain on palpation in the LLQ  - takes esomeprazole at home  - start on PPI, monitor for worsening of symptoms as inpatient  - GI f/u as o/p    # DVT ppx- LMWH  # GI ppx- PPI  # Activity- as tolerated   # Diet- cc  # Code status full  # Dispo- to medicine floor    for Follow up  -finger sitcks  - adjust insulin  - f/u inflammatory markers  - f/u cardio and endo consult   - Start dexa q24hrs        Vital Signs Last 24 Hrs  T(C): 36.7 (05 Jan 2021 12:00), Max: 37.2 (04 Jan 2021 16:00)  T(F): 98 (05 Jan 2021 12:00), Max: 99 (04 Jan 2021 16:00)  HR: 88 (05 Jan 2021 12:00) (78 - 103)  BP: 170/87 (05 Jan 2021 12:00) (125/67 - 203/100)  BP(mean): 121 (05 Jan 2021 12:00) (90 - 140)  RR: 22 (05 Jan 2021 12:00) (22 - 30)  SpO2: 94% (05 Jan 2021 12:00) (91% - 96%)  I&O's Summary    04 Jan 2021 07:01  -  05 Jan 2021 07:00  --------------------------------------------------------  IN: 1391 mL / OUT: 600 mL / NET: 791 mL    05 Jan 2021 07:01  -  05 Jan 2021 13:04  --------------------------------------------------------  IN: 0 mL / OUT: 700 mL / NET: -700 mL          MEDICATIONS  (STANDING):  amLODIPine   Tablet 5 milliGRAM(s) Oral daily  dextrose 40% Gel 15 Gram(s) Oral once  dextrose 5%. 1000 milliLiter(s) (50 mL/Hr) IV Continuous <Continuous>  dextrose 5%. 1000 milliLiter(s) (100 mL/Hr) IV Continuous <Continuous>  dextrose 50% Injectable 25 Gram(s) IV Push once  dextrose 50% Injectable 12.5 Gram(s) IV Push once  dextrose 50% Injectable 25 Gram(s) IV Push once  enalapril 5 milliGRAM(s) Oral daily  enoxaparin Study Injectable () 1 Dose(s) SubCutaneous two times a day  glucagon  Injectable 1 milliGRAM(s) IntraMuscular once  insulin glargine Injectable (LANTUS) 20 Unit(s) SubCutaneous every morning  insulin lispro Injectable (ADMELOG) 7 Unit(s) SubCutaneous three times a day before meals  metoclopramide 10 milliGRAM(s) Oral three times a day    MEDICATIONS  (PRN):        LABS                                            15.6                  Neurophils% (auto):   x      (01-05 @ 08:29):    4.81 )-----------(237          Lymphocytes% (auto):  x                                             45.7                   Eosinphils% (auto):   x        Manual%: Neutrophils x    ; Lymphocytes x    ; Eosinophils x    ; Bands%: x    ; Blasts x                                    137    |  97     |  11                  Calcium: 8.2   / iCa: x      (01-05 @ 08:29)    ----------------------------<  284       Magnesium: 1.9                              3.7     |  25     |  0.5              Phosphorous: 2.8      TPro  6.2    /  Alb  3.0    /  TBili  1.1    /  DBili  x      /  AST  48     /  ALT  43     /  AlkPhos  77     05 Jan 2021 08:29    ( 01-04 @ 16:55 )   PT: 12.70 sec;   INR: 1.10 ratio  aPTT: x Transfer Note    Transfer from: CEU  Transfer to:  ( x ) Medicine    (  ) Telemetry    (  ) RCU    (  ) Palliative    (  ) Stroke Unit    (  ) _______________    HPI:  70 year old male with a PMhx of DMII, PUD.     CC: weakness and decreased PO intake    History goes back to: 2 days ago when patient started having decreased PO intake, and started feeling very fatigued. Patients weakness got worse and so he presented to the ED    ROS is positive for: patient reports chills at home, pleuritic chest pain (only on coughing and taking a deep breath). Additionally patient reports some diffuse abdominal pain, and mild nausea.     ROS is negative for: no SOB, no palpitations. Patient denies change in urinary or bowel habits. No lower extremity edema /swelling     Of note: patient was diagnosed with COVID almost 10 days ago and he was feeling carrol until 2 days ago.     In the ED: vital signs WNL  Labs significant for: d-dimer 1700, b-oh:5, glucose: 361  Troponin: negative  CT A chest: No pulmonary embolus.  Scattered peripheral ground - glass opacities, compatible with viral/infectious etiology.  Multiple bilateral pulmonary nodules measuring up to 6 mm in the left upper lobe.  Patient admitted for DKA.      (03 Jan 2021 07:01)    CEU COURSE:  In the CEU patient was placed on insulin drip. BMP around the clock. Monitor anion gap, monitor K, phos. FS every 1 hr when on insulin drip, now anion gap closed, start PO diet, bridge to Subq Insulin, switch IV fluid to maintenance fluid. Noted to be Hypertensive, started on enalipril and amlodipine. No acute events on the unit. Pt stable to be downgraded to medicine.         ASSESSMENT & PLAN:     #DM with DKA - precipitated by viral infection  - as per pts son, pt recently started on insulin pump but has not taken insulin for past 2 weeks since he was home with covid and his son, who normally aids in his care, was unable to visit him.   - on admissions B-OH=5, AG=22  - started on insulin drip, AG closed, bridged to insulin sub q, started on PO diet  - a1c 11.9, fs still elevated in the 200s  - c/w FS q6hrs  - c/w lantus 20, lispro 7/7/7 with +1 sliding scale  - adjust accordingly     #COVID PNA  - CTA chest: Scattered bilateral peripheral groundglass opacities, compatible with infectious/inflammatory etiology (Covid pneumonia).  Multiple bilateral pulmonary nodules measuring up to 6 mm in the left upper lobe. Recommend a 12 month follow-up chest CT.  - follow up inflammatory markers (procal, crp, ferritin)   - no remdesivir given onset of symptoms   - Low suspicion for concomitant bacterial PNA so will hold AN-B  - c/w Lovenox 40 daily   - Was on RA at admission, now requires O2 (2L NC)  SpO2  - DDimer 848, Fibrinogen >700,   - Start dexa q24hrs  - if worsening, ID eval    #Lung nodules found on CT  - Meassuring 6mm in left  upper lobe  - f/u o/p in 12 months for f/u CT    # possible pericarditis?  - trops (-) x2  - f/u TTE    #Hx of Peptic ulcer  # Divirticulitis   # Chronic hiccups  - takes celebrex at home, d/c home med  - has abdominal pain on palpation in the LLQ  - takes esomeprazole at home  - start on PPI, monitor for worsening of symptoms as inpatient  - GI f/u as o/p (Dr Bobo: [phone])    # DVT ppx- LMWH  # GI ppx- PPI  # Activity- as tolerated   # Diet- cc  # Code status full  # Dispo- to medicine floor    for Follow up  -finger sitcks  - adjust insulin  - f/u inflammatory markers  - f/u cardio and endo consult   - Start dexa q24hrs        Vital Signs Last 24 Hrs  T(C): 36.7 (05 Jan 2021 12:00), Max: 37.2 (04 Jan 2021 16:00)  T(F): 98 (05 Jan 2021 12:00), Max: 99 (04 Jan 2021 16:00)  HR: 88 (05 Jan 2021 12:00) (78 - 103)  BP: 170/87 (05 Jan 2021 12:00) (125/67 - 203/100)  BP(mean): 121 (05 Jan 2021 12:00) (90 - 140)  RR: 22 (05 Jan 2021 12:00) (22 - 30)  SpO2: 94% (05 Jan 2021 12:00) (91% - 96%)  I&O's Summary    04 Jan 2021 07:01  -  05 Jan 2021 07:00  --------------------------------------------------------  IN: 1391 mL / OUT: 600 mL / NET: 791 mL    05 Jan 2021 07:01  -  05 Jan 2021 13:04  --------------------------------------------------------  IN: 0 mL / OUT: 700 mL / NET: -700 mL          MEDICATIONS  (STANDING):  amLODIPine   Tablet 5 milliGRAM(s) Oral daily  dextrose 40% Gel 15 Gram(s) Oral once  dextrose 5%. 1000 milliLiter(s) (50 mL/Hr) IV Continuous <Continuous>  dextrose 5%. 1000 milliLiter(s) (100 mL/Hr) IV Continuous <Continuous>  dextrose 50% Injectable 25 Gram(s) IV Push once  dextrose 50% Injectable 12.5 Gram(s) IV Push once  dextrose 50% Injectable 25 Gram(s) IV Push once  enalapril 5 milliGRAM(s) Oral daily  enoxaparin Study Injectable () 1 Dose(s) SubCutaneous two times a day  glucagon  Injectable 1 milliGRAM(s) IntraMuscular once  insulin glargine Injectable (LANTUS) 20 Unit(s) SubCutaneous every morning  insulin lispro Injectable (ADMELOG) 7 Unit(s) SubCutaneous three times a day before meals  metoclopramide 10 milliGRAM(s) Oral three times a day    MEDICATIONS  (PRN):        LABS                                            15.6                  Neurophils% (auto):   x      (01-05 @ 08:29):    4.81 )-----------(237          Lymphocytes% (auto):  x                                             45.7                   Eosinphils% (auto):   x        Manual%: Neutrophils x    ; Lymphocytes x    ; Eosinophils x    ; Bands%: x    ; Blasts x                                    137    |  97     |  11                  Calcium: 8.2   / iCa: x      (01-05 @ 08:29)    ----------------------------<  284       Magnesium: 1.9                              3.7     |  25     |  0.5              Phosphorous: 2.8      TPro  6.2    /  Alb  3.0    /  TBili  1.1    /  DBili  x      /  AST  48     /  ALT  43     /  AlkPhos  77     05 Jan 2021 08:29    ( 01-04 @ 16:55 )   PT: 12.70 sec;   INR: 1.10 ratio  aPTT: x Transfer Note    Transfer from: CEU  Transfer to:  ( x ) Medicine    (  ) Telemetry    (  ) RCU    (  ) Palliative    (  ) Stroke Unit    (  ) _______________    HPI:  70 year old male with a PMhx of DMII, PUD.     CC: weakness and decreased PO intake    History goes back to: 2 days ago when patient started having decreased PO intake, and started feeling very fatigued. Patients weakness got worse and so he presented to the ED    ROS is positive for: patient reports chills at home, pleuritic chest pain (only on coughing and taking a deep breath). Additionally patient reports some diffuse abdominal pain, and mild nausea.     ROS is negative for: no SOB, no palpitations. Patient denies change in urinary or bowel habits. No lower extremity edema /swelling     Of note: patient was diagnosed with COVID almost 10 days ago and he was feeling carrol until 2 days ago.     In the ED: vital signs WNL  Labs significant for: d-dimer 1700, b-oh:5, glucose: 361  Troponin: negative  CT A chest: No pulmonary embolus.  Scattered peripheral ground - glass opacities, compatible with viral/infectious etiology.  Multiple bilateral pulmonary nodules measuring up to 6 mm in the left upper lobe.  Patient admitted for DKA.      (03 Jan 2021 07:01)    CEU COURSE:  In the CEU patient was placed on insulin drip. BMP around the clock. Monitor anion gap, monitor K, phos. FS every 1 hr when on insulin drip, now anion gap closed, start PO diet, bridge to Subq Insulin, switch IV fluid to maintenance fluid. Noted to be Hypertensive, started on enalipril and amlodipine. No acute events on the unit. Pt stable to be downgraded to medicine.         ASSESSMENT & PLAN:     #DM with DKA - precipitated by viral infection  - as per pts son, pt recently started on insulin pump but has not taken insulin for past 2 weeks since he was home with covid and his son, who normally aids in his care, was unable to visit him.   - on admissions B-OH=5, AG=22  - started on insulin drip, AG closed, bridged to insulin sub q, started on PO diet  - a1c 11.9, fs still elevated in the 200s  - c/w FS q6hrs  - c/w lantus 20, lispro 7/7/7 with +1 sliding scale  - adjust accordingly     #COVID PNA  - CTA chest: Scattered bilateral peripheral groundglass opacities, compatible with infectious/inflammatory etiology (Covid pneumonia).  Multiple bilateral pulmonary nodules measuring up to 6 mm in the left upper lobe. Recommend a 12 month follow-up chest CT.  - follow up inflammatory markers (procal, crp, ferritin)   - no remdesivir given onset of symptoms   - Low suspicion for concomitant bacterial PNA so will hold AN-B  - c/w Lovenox 40 daily   - Was on RA at admission, now requires O2 (2L NC)  SpO2  - DDimer 848, Fibrinogen >700,   - Start dexa q24hrs  - if worsening, ID eval    #Lung nodules found on CT  - Meassuring 6mm in left  upper lobe  - f/u o/p in 12 months for f/u CT    # possible pericarditis?  - trops (-) x2  - f/u TTE    #Hx of Peptic ulcer  # Divirticulitis   # Chronic hiccups  - takes celebrex at home, d/c home med  - has abdominal pain on palpation in the LLQ  - takes esomeprazole at home  - start on PPI, monitor for worsening of symptoms as inpatient  - f/u CT A&P w/wo po and IV contrast   - follows GI as o/p, Dr Bobo: [phone]    #gastroparesis  - monitor BMs  - start on appropriate bowel regimen      # DVT ppx- LMWH  # GI ppx- PPI  # Activity- as tolerated   # Diet- cc  # Code status full  # Dispo- to medicine floor    for Follow up  -finger sitcks  - adjust insulin  - f/u inflammatory markers  - f/u cardio and endo consult   - Start dexa q24hrs  - f/u CT A&P w/wo po and IV contrast   - start on appropriate bowel regimen for gastroparesis if constipated       Vital Signs Last 24 Hrs  T(C): 36.7 (05 Jan 2021 12:00), Max: 37.2 (04 Jan 2021 16:00)  T(F): 98 (05 Jan 2021 12:00), Max: 99 (04 Jan 2021 16:00)  HR: 88 (05 Jan 2021 12:00) (78 - 103)  BP: 170/87 (05 Jan 2021 12:00) (125/67 - 203/100)  BP(mean): 121 (05 Jan 2021 12:00) (90 - 140)  RR: 22 (05 Jan 2021 12:00) (22 - 30)  SpO2: 94% (05 Jan 2021 12:00) (91% - 96%)  I&O's Summary    04 Jan 2021 07:01  -  05 Jan 2021 07:00  --------------------------------------------------------  IN: 1391 mL / OUT: 600 mL / NET: 791 mL    05 Jan 2021 07:01  -  05 Jan 2021 13:04  --------------------------------------------------------  IN: 0 mL / OUT: 700 mL / NET: -700 mL          MEDICATIONS  (STANDING):  amLODIPine   Tablet 5 milliGRAM(s) Oral daily  dextrose 40% Gel 15 Gram(s) Oral once  dextrose 5%. 1000 milliLiter(s) (50 mL/Hr) IV Continuous <Continuous>  dextrose 5%. 1000 milliLiter(s) (100 mL/Hr) IV Continuous <Continuous>  dextrose 50% Injectable 25 Gram(s) IV Push once  dextrose 50% Injectable 12.5 Gram(s) IV Push once  dextrose 50% Injectable 25 Gram(s) IV Push once  enalapril 5 milliGRAM(s) Oral daily  enoxaparin Study Injectable () 1 Dose(s) SubCutaneous two times a day  glucagon  Injectable 1 milliGRAM(s) IntraMuscular once  insulin glargine Injectable (LANTUS) 20 Unit(s) SubCutaneous every morning  insulin lispro Injectable (ADMELOG) 7 Unit(s) SubCutaneous three times a day before meals  metoclopramide 10 milliGRAM(s) Oral three times a day    MEDICATIONS  (PRN):        LABS                                            15.6                  Neurophils% (auto):   x      (01-05 @ 08:29):    4.81 )-----------(237          Lymphocytes% (auto):  x                                             45.7                   Eosinphils% (auto):   x        Manual%: Neutrophils x    ; Lymphocytes x    ; Eosinophils x    ; Bands%: x    ; Blasts x                                    137    |  97     |  11                  Calcium: 8.2   / iCa: x      (01-05 @ 08:29)    ----------------------------<  284       Magnesium: 1.9                              3.7     |  25     |  0.5              Phosphorous: 2.8      TPro  6.2    /  Alb  3.0    /  TBili  1.1    /  DBili  x      /  AST  48     /  ALT  43     /  AlkPhos  77     05 Jan 2021 08:29    ( 01-04 @ 16:55 )   PT: 12.70 sec;   INR: 1.10 ratio  aPTT: x Transfer Note    Transfer from: CEU  Transfer to:  ( x ) Medicine    (  ) Telemetry    (  ) RCU    (  ) Palliative    (  ) Stroke Unit    (  ) _______________    HPI:  70 year old male with a PMhx of DMII, PUD.     CC: weakness and decreased PO intake    History goes back to: 2 days ago when patient started having decreased PO intake, and started feeling very fatigued. Patients weakness got worse and so he presented to the ED    ROS is positive for: patient reports chills at home, pleuritic chest pain (only on coughing and taking a deep breath). Additionally patient reports some diffuse abdominal pain, and mild nausea.     ROS is negative for: no SOB, no palpitations. Patient denies change in urinary or bowel habits. No lower extremity edema /swelling     Of note: patient was diagnosed with COVID almost 10 days ago and he was feeling carrol until 2 days ago.     In the ED: vital signs WNL  Labs significant for: d-dimer 1700, b-oh:5, glucose: 361  Troponin: negative  CT A chest: No pulmonary embolus.  Scattered peripheral ground - glass opacities, compatible with viral/infectious etiology.  Multiple bilateral pulmonary nodules measuring up to 6 mm in the left upper lobe.  Patient admitted for DKA.      (03 Jan 2021 07:01)    CEU COURSE:  In the CEU patient was placed on insulin drip. BMP around the clock. Monitor anion gap, monitor K, phos. FS every 1 hr when on insulin drip, now anion gap closed, start PO diet, bridge to Subq Insulin, switch IV fluid to maintenance fluid. Noted to be Hypertensive, started on enalipril and amlodipine. No acute events on the unit. Pt stable to be downgraded to medicine.         ASSESSMENT & PLAN:     #DM with DKA - precipitated by viral infection  - as per pts son, pt recently started on insulin pump but has not taken insulin for past 2 weeks since he was home with covid and his son, who normally aids in his care, was unable to visit him.   - on admissions B-OH=5, AG=22  - started on insulin drip, AG closed, bridged to insulin sub q, started on PO diet  - a1c 11.9, fs still elevated in the 200s  - c/w FS q6hrs  - c/w lantus 20, lispro 7/7/7 with +1 sliding scale  - adjust accordingly     #COVID PNA  - CTA chest: Scattered bilateral peripheral groundglass opacities, compatible with infectious/inflammatory etiology (Covid pneumonia).  Multiple bilateral pulmonary nodules measuring up to 6 mm in the left upper lobe. Recommend a 12 month follow-up chest CT.  - follow up inflammatory markers (procal, crp, ferritin)   - no remdesivir given onset of symptoms   - Low suspicion for concomitant bacterial PNA so will hold AN-B  - c/w Lovenox 40 daily   - Was on RA at admission, now requires O2 (2L NC)  SpO2  - DDimer 848, Fibrinogen >700,   - Start dexa q24hrs  - if worsening, ID eval    #Lung nodules found on CT  - Meassuring 6mm in left  upper lobe  - f/u o/p in 12 months for f/u CT    # possible pericarditis?  - trops (-) x2  - f/u TTE    #Hx of Peptic ulcer  # Divirticulitis   # Chronic hiccups  - takes celebrex at home, d/c home med  - has abdominal pain on palpation in the LLQ  - takes esomeprazole at home  - start on PPI, monitor for worsening of symptoms as inpatient  - If abdominal pain worsens, order urgent CT A&P w/wo po and IV contrast to r/o infection vs ischemia   - follows GI as o/p, Dr Bobo: [phone]    #gastroparesis  - monitor BMs  - start on appropriate bowel regimen      # DVT ppx- LMWH  # GI ppx- PPI  # Activity- as tolerated   # Diet- cc  # Code status full  # Dispo- to medicine floor    for Follow up  -finger sitcks  - adjust insulin  - f/u inflammatory markers  - f/u cardio and endo consult   - Start dexa q24hrs  - order CT A&P w/wo po and IV contrast if worsening ab pain   - start on appropriate bowel regimen for gastroparesis if constipated       Vital Signs Last 24 Hrs  T(C): 36.7 (05 Jan 2021 12:00), Max: 37.2 (04 Jan 2021 16:00)  T(F): 98 (05 Jan 2021 12:00), Max: 99 (04 Jan 2021 16:00)  HR: 88 (05 Jan 2021 12:00) (78 - 103)  BP: 170/87 (05 Jan 2021 12:00) (125/67 - 203/100)  BP(mean): 121 (05 Jan 2021 12:00) (90 - 140)  RR: 22 (05 Jan 2021 12:00) (22 - 30)  SpO2: 94% (05 Jan 2021 12:00) (91% - 96%)  I&O's Summary    04 Jan 2021 07:01  -  05 Jan 2021 07:00  --------------------------------------------------------  IN: 1391 mL / OUT: 600 mL / NET: 791 mL    05 Jan 2021 07:01  -  05 Jan 2021 13:04  --------------------------------------------------------  IN: 0 mL / OUT: 700 mL / NET: -700 mL          MEDICATIONS  (STANDING):  amLODIPine   Tablet 5 milliGRAM(s) Oral daily  dextrose 40% Gel 15 Gram(s) Oral once  dextrose 5%. 1000 milliLiter(s) (50 mL/Hr) IV Continuous <Continuous>  dextrose 5%. 1000 milliLiter(s) (100 mL/Hr) IV Continuous <Continuous>  dextrose 50% Injectable 25 Gram(s) IV Push once  dextrose 50% Injectable 12.5 Gram(s) IV Push once  dextrose 50% Injectable 25 Gram(s) IV Push once  enalapril 5 milliGRAM(s) Oral daily  enoxaparin Study Injectable () 1 Dose(s) SubCutaneous two times a day  glucagon  Injectable 1 milliGRAM(s) IntraMuscular once  insulin glargine Injectable (LANTUS) 20 Unit(s) SubCutaneous every morning  insulin lispro Injectable (ADMELOG) 7 Unit(s) SubCutaneous three times a day before meals  metoclopramide 10 milliGRAM(s) Oral three times a day    MEDICATIONS  (PRN):        LABS                                            15.6                  Neurophils% (auto):   x      (01-05 @ 08:29):    4.81 )-----------(237          Lymphocytes% (auto):  x                                             45.7                   Eosinphils% (auto):   x        Manual%: Neutrophils x    ; Lymphocytes x    ; Eosinophils x    ; Bands%: x    ; Blasts x                                    137    |  97     |  11                  Calcium: 8.2   / iCa: x      (01-05 @ 08:29)    ----------------------------<  284       Magnesium: 1.9                              3.7     |  25     |  0.5              Phosphorous: 2.8      TPro  6.2    /  Alb  3.0    /  TBili  1.1    /  DBili  x      /  AST  48     /  ALT  43     /  AlkPhos  77     05 Jan 2021 08:29    ( 01-04 @ 16:55 )   PT: 12.70 sec;   INR: 1.10 ratio  aPTT: x Transfer Note    Transfer from: CEU  Transfer to:  ( x ) Medicine    (  ) Telemetry    (  ) RCU    (  ) Palliative    (  ) Stroke Unit    (  ) _______________    HPI:  70 year old male with a PMhx of DMII, PUD.     CC: weakness and decreased PO intake    History goes back to: 2 days ago when patient started having decreased PO intake, and started feeling very fatigued. Patients weakness got worse and so he presented to the ED    ROS is positive for: patient reports chills at home, pleuritic chest pain (only on coughing and taking a deep breath). Additionally patient reports some diffuse abdominal pain, and mild nausea.     ROS is negative for: no SOB, no palpitations. Patient denies change in urinary or bowel habits. No lower extremity edema /swelling     Of note: patient was diagnosed with COVID almost 10 days ago and he was feeling carrol until 2 days ago.     In the ED: vital signs WNL  Labs significant for: d-dimer 1700, b-oh:5, glucose: 361  Troponin: negative  CT A chest: No pulmonary embolus.  Scattered peripheral ground - glass opacities, compatible with viral/infectious etiology.  Multiple bilateral pulmonary nodules measuring up to 6 mm in the left upper lobe.  Patient admitted for DKA.      (03 Jan 2021 07:01)    CEU COURSE:  In the CEU patient was placed on insulin drip. BMP around the clock. Monitor anion gap, monitor K, phos. FS every 1 hr when on insulin drip, now anion gap closed, start PO diet, bridge to Subq Insulin, switch IV fluid to maintenance fluid. Noted to be Hypertensive, started on enalipril and amlodipine. No acute events on the unit. Pt stable to be downgraded to medicine.     ASSESSMENT & PLAN:     #DM with DKA - precipitated by viral infection  - as per pts son, pt recently started on insulin pump but has not taken insulin for past 2 weeks since he was home with covid and his son, who normally aids in his care, was unable to visit him.   - on admissions B-OH=5, AG=22  - started on insulin drip, AG closed, bridged to insulin sub q, started on PO diet  - a1c 11.9, fs still elevated in the 200s  - c/w FS q6hrs  - c/w lantus 20, lispro 7/7/7 with +1 sliding scale  - adjust accordingly     #COVID PNA  - CTA chest: Scattered bilateral peripheral groundglass opacities, compatible with infectious/inflammatory etiology (Covid pneumonia).  Multiple bilateral pulmonary nodules measuring up to 6 mm in the left upper lobe. Recommend a 12 month follow-up chest CT.  - follow up inflammatory markers (procal, crp, ferritin)   - no remdesivir given onset of symptoms   - Low suspicion for concomitant bacterial PNA so will hold AN-B  - c/w Lovenox 40 daily   - Was on RA at admission, now requires O2 (2L NC)  SpO2  - DDimer 848, Fibrinogen >700,   - Start dexa q24hrs  - if worsening, ID eval    #Lung nodules found on CT  - Meassuring 6mm in left  upper lobe  - f/u o/p in 12 months for f/u CT    # possible pericarditis?  - trops (-) x2  - f/u TTE    #Complaining of rt LE pain  - pain with welling and erythema in the rt calf  - f/u venous doppler    #Hx of Peptic ulcer  # Divirticulitis   # Chronic hiccups  - takes celebrex at home, d/c home med  - has abdominal pain on palpation in the LLQ  - takes esomeprazole at home  - start on PPI, monitor for worsening of symptoms as inpatient  - If abdominal pain worsens, order urgent CT A&P w/wo po and IV contrast to r/o infection vs ischemia   - follows GI as o/p, Dr Bobo: [phone]    #gastroparesis  - monitor BMs  - start on appropriate bowel regimen      # DVT ppx- LMWH  # GI ppx- PPI  # Activity- as tolerated   # Diet- cc  # Code status full  # Dispo- to medicine floor    for Follow up  -finger sitcks  - adjust insulin  - f/u inflammatory markers  - f/u cardio and endo consult   - Start dexa q24hrs  - order CT A&P w/wo po and IV contrast if worsening ab pain   - start on appropriate bowel regimen for gastroparesis if constipated   - f/u venous doppler      Vital Signs Last 24 Hrs  T(C): 36.7 (05 Jan 2021 12:00), Max: 37.2 (04 Jan 2021 16:00)  T(F): 98 (05 Jan 2021 12:00), Max: 99 (04 Jan 2021 16:00)  HR: 88 (05 Jan 2021 12:00) (78 - 103)  BP: 170/87 (05 Jan 2021 12:00) (125/67 - 203/100)  BP(mean): 121 (05 Jan 2021 12:00) (90 - 140)  RR: 22 (05 Jan 2021 12:00) (22 - 30)  SpO2: 94% (05 Jan 2021 12:00) (91% - 96%)  I&O's Summary    04 Jan 2021 07:01  -  05 Jan 2021 07:00  --------------------------------------------------------  IN: 1391 mL / OUT: 600 mL / NET: 791 mL    05 Jan 2021 07:01  -  05 Jan 2021 13:04  --------------------------------------------------------  IN: 0 mL / OUT: 700 mL / NET: -700 mL          MEDICATIONS  (STANDING):  amLODIPine   Tablet 5 milliGRAM(s) Oral daily  dextrose 40% Gel 15 Gram(s) Oral once  dextrose 5%. 1000 milliLiter(s) (50 mL/Hr) IV Continuous <Continuous>  dextrose 5%. 1000 milliLiter(s) (100 mL/Hr) IV Continuous <Continuous>  dextrose 50% Injectable 25 Gram(s) IV Push once  dextrose 50% Injectable 12.5 Gram(s) IV Push once  dextrose 50% Injectable 25 Gram(s) IV Push once  enalapril 5 milliGRAM(s) Oral daily  enoxaparin Study Injectable () 1 Dose(s) SubCutaneous two times a day  glucagon  Injectable 1 milliGRAM(s) IntraMuscular once  insulin glargine Injectable (LANTUS) 20 Unit(s) SubCutaneous every morning  insulin lispro Injectable (ADMELOG) 7 Unit(s) SubCutaneous three times a day before meals  metoclopramide 10 milliGRAM(s) Oral three times a day    MEDICATIONS  (PRN):        LABS                                            15.6                  Neurophils% (auto):   x      (01-05 @ 08:29):    4.81 )-----------(237          Lymphocytes% (auto):  x                                             45.7                   Eosinphils% (auto):   x        Manual%: Neutrophils x    ; Lymphocytes x    ; Eosinophils x    ; Bands%: x    ; Blasts x                                    137    |  97     |  11                  Calcium: 8.2   / iCa: x      (01-05 @ 08:29)    ----------------------------<  284       Magnesium: 1.9                              3.7     |  25     |  0.5              Phosphorous: 2.8      TPro  6.2    /  Alb  3.0    /  TBili  1.1    /  DBili  x      /  AST  48     /  ALT  43     /  AlkPhos  77     05 Jan 2021 08:29    ( 01-04 @ 16:55 )   PT: 12.70 sec;   INR: 1.10 ratio  aPTT: x Transfer Note    Transfer from: CEU  Transfer to:  ( x ) Medicine    (  ) Telemetry    (  ) RCU    (  ) Palliative    (  ) Stroke Unit    (  ) _______________    HPI:  70 year old male with a PMhx of DMII, PUD.     CC: weakness and decreased PO intake    History goes back to: 2 days ago when patient started having decreased PO intake, and started feeling very fatigued. Patients weakness got worse and so he presented to the ED    ROS is positive for: patient reports chills at home, pleuritic chest pain (only on coughing and taking a deep breath). Additionally patient reports some diffuse abdominal pain, and mild nausea.     ROS is negative for: no SOB, no palpitations. Patient denies change in urinary or bowel habits. No lower extremity edema /swelling     Of note: patient was diagnosed with COVID almost 10 days ago and he was feeling carrol until 2 days ago.     In the ED: vital signs WNL  Labs significant for: d-dimer 1700, b-oh:5, glucose: 361  Troponin: negative  CT A chest: No pulmonary embolus.  Scattered peripheral ground - glass opacities, compatible with viral/infectious etiology.  Multiple bilateral pulmonary nodules measuring up to 6 mm in the left upper lobe.  Patient admitted for DKA.      (03 Jan 2021 07:01)    CEU COURSE:  In the CEU patient was placed on insulin drip. BMP around the clock. Monitor anion gap, monitor K, phos. FS every 1 hr when on insulin drip, now anion gap closed, start PO diet, bridge to Subq Insulin, switch IV fluid to maintenance fluid. Noted to be Hypertensive, started on enalipril and amlodipine. No acute events on the unit. Pt stable to be downgraded to medicine.     ASSESSMENT & PLAN:     #DM with DKA - precipitated by viral infection  - as per pts son, pt recently started on insulin pump but has not taken insulin for past 2 weeks since he was home with covid and his son, who normally aids in his care, was unable to visit him.   - on admissions B-OH=5, AG=22  - started on insulin drip, AG closed, bridged to insulin sub q, started on PO diet  - a1c 11.9, fs still elevated in the 200s  - c/w FS q6hrs  - c/w lantus 20, lispro 7/7/7 with +1 sliding scale  - adjust accordingly     #COVID PNA  - CTA chest: Scattered bilateral peripheral groundglass opacities, compatible with infectious/inflammatory etiology (Covid pneumonia).  Multiple bilateral pulmonary nodules measuring up to 6 mm in the left upper lobe. Recommend a 12 month follow-up chest CT.  - follow up inflammatory markers (procal, crp, ferritin)   - no remdesivir given onset of symptoms   - Low suspicion for concomitant bacterial PNA so will hold AN-B  - c/w Lovenox 40 daily   - Was on RA at admission, now requires O2 (2L NC)  SpO2  - DDimer 848, Fibrinogen >700,   - Start dexa q24hrs  - if worsening, ID eval    #Lung nodules found on CT  - Meassuring 6mm in left  upper lobe  - f/u o/p in 12 months for f/u CT    # possible pericarditis?  - trops (-) x2  - f/u TTE    #Complaining of rt LE pain  - pain with welling and erythema in the rt calf  - f/u venous doppler    #Hx of Peptic ulcer  # Divirticulitis   # Chronic hiccups  - takes celebrex at home, d/c home med  - has abdominal pain on palpation in the LLQ  - takes esomeprazole at home  - start on PPI, monitor for worsening of symptoms as inpatient  - If abdominal pain worsens, order urgent CT A&P w/wo po and IV contrast to r/o infection vs ischemia   - follows GI as o/p, Dr Bobo: [phone]    #gastroparesis  - monitor BMs  - start on appropriate bowel regimen      # DVT ppx- LMWH (dose blinded, enrolled in research)   # GI ppx- PPI  # Activity- as tolerated   # Diet- cc  # Code status full  # Dispo- to medicine floor    for Follow up  -finger sitcks  - adjust insulin  - f/u inflammatory markers  - f/u cardio and endo consult   - Start dexa q24hrs  - order CT A&P w/wo po and IV contrast if worsening ab pain   - start on appropriate bowel regimen for gastroparesis if constipated   - f/u venous doppler      Vital Signs Last 24 Hrs  T(C): 36.7 (05 Jan 2021 12:00), Max: 37.2 (04 Jan 2021 16:00)  T(F): 98 (05 Jan 2021 12:00), Max: 99 (04 Jan 2021 16:00)  HR: 88 (05 Jan 2021 12:00) (78 - 103)  BP: 170/87 (05 Jan 2021 12:00) (125/67 - 203/100)  BP(mean): 121 (05 Jan 2021 12:00) (90 - 140)  RR: 22 (05 Jan 2021 12:00) (22 - 30)  SpO2: 94% (05 Jan 2021 12:00) (91% - 96%)  I&O's Summary    04 Jan 2021 07:01  -  05 Jan 2021 07:00  --------------------------------------------------------  IN: 1391 mL / OUT: 600 mL / NET: 791 mL    05 Jan 2021 07:01  -  05 Jan 2021 13:04  --------------------------------------------------------  IN: 0 mL / OUT: 700 mL / NET: -700 mL          MEDICATIONS  (STANDING):  amLODIPine   Tablet 5 milliGRAM(s) Oral daily  dextrose 40% Gel 15 Gram(s) Oral once  dextrose 5%. 1000 milliLiter(s) (50 mL/Hr) IV Continuous <Continuous>  dextrose 5%. 1000 milliLiter(s) (100 mL/Hr) IV Continuous <Continuous>  dextrose 50% Injectable 25 Gram(s) IV Push once  dextrose 50% Injectable 12.5 Gram(s) IV Push once  dextrose 50% Injectable 25 Gram(s) IV Push once  enalapril 5 milliGRAM(s) Oral daily  enoxaparin Study Injectable () 1 Dose(s) SubCutaneous two times a day  glucagon  Injectable 1 milliGRAM(s) IntraMuscular once  insulin glargine Injectable (LANTUS) 20 Unit(s) SubCutaneous every morning  insulin lispro Injectable (ADMELOG) 7 Unit(s) SubCutaneous three times a day before meals  metoclopramide 10 milliGRAM(s) Oral three times a day    MEDICATIONS  (PRN):        LABS                                            15.6                  Neurophils% (auto):   x      (01-05 @ 08:29):    4.81 )-----------(237          Lymphocytes% (auto):  x                                             45.7                   Eosinphils% (auto):   x        Manual%: Neutrophils x    ; Lymphocytes x    ; Eosinophils x    ; Bands%: x    ; Blasts x                                    137    |  97     |  11                  Calcium: 8.2   / iCa: x      (01-05 @ 08:29)    ----------------------------<  284       Magnesium: 1.9                              3.7     |  25     |  0.5              Phosphorous: 2.8      TPro  6.2    /  Alb  3.0    /  TBili  1.1    /  DBili  x      /  AST  48     /  ALT  43     /  AlkPhos  77     05 Jan 2021 08:29    ( 01-04 @ 16:55 )   PT: 12.70 sec;   INR: 1.10 ratio  aPTT: x

## 2021-01-05 NOTE — PROGRESS NOTE ADULT - ASSESSMENT
IMPRESSION:  #DKA - precipitated by viral infection   #HAGMA   # possible pericarditis?  #COVID 19 PNA - mild       PLAN:    CNS: Avoid CNS depressants.    HEENT:  Oral care.     PULMONARY:  HOB @ 45 degree.     CARDIOVASCULAR:  - Follow up echocardiogram   -  trops negative x2    GI: GI prophylaxis.                      RENAL:  HAGMA due to DKA  - monitor for hypokalemia and hypophosphatemia    INFECTIOUS DISEASE: COVID PNA  mild disease, no signs of CRS  manifestations: PNA   - follow up inflammatory markers (procal, crp, ferritin)   - patient spo2=98% on room air  - no remdesivir given onset of symptoms   - no indication for dexamethasone unless repiratory status worsening  - Low suspicion for concomitant bacterial PNA so will hold AN-B  - Lovenox 40 daily   -requires O2 to maintain sat, start dexa q24hrs    HEMATOLOGICAL:  DVT prophylaxis.     ENDOCRINE:  DKA  B-OH=5, AG=22  - BMP around the clock. Monitor anion gap, monitor K, phos  - FS every 1 hr when on insulin drip   - was on insulin drip, now anion gap closed, start PO diet, bridge to Subq Insulin, switch IV fluid to D5 1/2 NS      for Follow up  -finger sitcks  -hb a1c  -inflammatory markers   #DM with DKA - precipitated by viral infection  - as per pts son, pt recently started on insulin pump but has not taken insulin for past 2 weeks since he was home with covid and his son, who normally aids in his care, was unable to visit him.   - on admissions B-OH=5, AG=22  - started on insulin drip, AG closed, bridged to insulin sub q, started on PO diet  - a1c 11.9, fs still elevated in the 200s  - c/w FS q6hrs  - c/w lantus 20, lispro 7/7/7 with +1 sliding scale  - adjust accordingly     #COVID PNA  - CTA chest: Scattered bilateral peripheral groundglass opacities, compatible with infectious/inflammatory etiology (Covid pneumonia).  Multiple bilateral pulmonary nodules measuring up to 6 mm in the left upper lobe. Recommend a 12 month follow-up chest CT.  - follow up inflammatory markers (procal, crp, ferritin)   - no remdesivir given onset of symptoms   - Low suspicion for concomitant bacterial PNA so will hold AN-B  - c/w Lovenox 40 daily   - Was on RA at admission, now requires O2 (2L NC)  SpO2  - DDimer 848, Fibrinogen >700,   - Start dexa q24hrs  - if worsening, ID eval    #Lung nodules found on CT  - Meassuring 6mm in left  upper lobe  - f/u o/p in 12 months for f/u CT    # possible pericarditis?  - trops (-) x2  - f/u TTE    #Hx of Peptic ulcer  # Divirticulitis   # Chronic hiccups  - takes celebrex at home, d/c home med  - has abdominal pain on palpation in the LLQ  - takes esomeprazole at home  - start on PPI, monitor for worsening of symptoms as inpatient  - f/u CT A&P w/wo po and IV contrast   - follows GI as o/p, Dr Bobo: [phone]    #gastroparesis  - monitor BMs  - start on appropriate bowel regimen      # DVT ppx- LMWH  # GI ppx- PPI  # Activity- as tolerated   # Diet- cc  # Code status full  # Dispo- to medicine floor     #DM with DKA - precipitated by viral infection  - as per pts son, pt recently started on insulin pump but has not taken insulin for past 2 weeks since he was home with covid and his son, who normally aids in his care, was unable to visit him.   - on admissions B-OH=5, AG=22  - started on insulin drip, AG closed, bridged to insulin sub q, started on PO diet  - a1c 11.9, fs still elevated in the 200s  - c/w FS q6hrs  - c/w lantus 20, lispro 7/7/7 with +1 sliding scale  - adjust accordingly     #COVID PNA  - CTA chest: Scattered bilateral peripheral groundglass opacities, compatible with infectious/inflammatory etiology (Covid pneumonia).  Multiple bilateral pulmonary nodules measuring up to 6 mm in the left upper lobe. Recommend a 12 month follow-up chest CT.  - follow up inflammatory markers (procal, crp, ferritin)   - no remdesivir given onset of symptoms   - Low suspicion for concomitant bacterial PNA so will hold AN-B  - c/w Lovenox 40 daily   - Was on RA at admission, now requires O2 (2L NC)  SpO2  - DDimer 848, Fibrinogen >700,   - Start dexa q24hrs  - if worsening, ID eval    #Lung nodules found on CT  - Meassuring 6mm in left  upper lobe  - f/u o/p in 12 months for f/u CT    # possible pericarditis?  - trops (-) x2  - f/u TTE    #Hx of Peptic ulcer  # Divirticulitis   # Chronic hiccups  - takes celebrex at home, d/c home med  - has abdominal pain on palpation in the LLQ  - takes esomeprazole at home  - start on PPI, monitor for worsening of symptoms as inpatient  - If abdominal pain worsens, order urgent CT A&P w/wo po and IV contrast to r/o infection vs ischemia   - follows GI as o/p, Dr Bobo: [phone]    #gastroparesis  - monitor BMs  - start on appropriate bowel regimen if constipated     # DVT ppx- LMWH  # GI ppx- PPI  # Activity- as tolerated   # Diet- cc  # Code status full  # Dispo- to medicine floor   #DM with DKA - precipitated by viral infection  - as per pts son, pt recently started on insulin pump but has not taken insulin for past 2 weeks since he was home with covid and his son, who normally aids in his care, was unable to visit him.   - on admissions B-OH=5, AG=22  - started on insulin drip, AG closed, bridged to insulin sub q, started on PO diet  - a1c 11.9, fs still elevated in the 200s  - c/w FS q6hrs  - c/w lantus 20, lispro 7/7/7 with +1 sliding scale  - adjust accordingly     #COVID PNA  - CTA chest: Scattered bilateral peripheral groundglass opacities, compatible with infectious/inflammatory etiology (Covid pneumonia).  Multiple bilateral pulmonary nodules measuring up to 6 mm in the left upper lobe. Recommend a 12 month follow-up chest CT.  - follow up inflammatory markers (procal, crp, ferritin)   - no remdesivir given onset of symptoms   - Low suspicion for concomitant bacterial PNA so will hold AN-B  - c/w Lovenox 40 daily   - Was on RA at admission, now requires O2 (2L NC)  SpO2  - DDimer 848, Fibrinogen >700,   - Start dexa q24hrs  - if worsening, ID eval    #Lung nodules found on CT  - Meassuring 6mm in left  upper lobe  - f/u o/p in 12 months for f/u CT    # possible pericarditis?  - trops (-) x2  - f/u TTE    #Hx of Peptic ulcer  # Divirticulitis   # Chronic hiccups  - takes celebrex at home, d/c home med  - has abdominal pain on palpation in the LLQ  - takes esomeprazole at home  - start on PPI, monitor for worsening of symptoms as inpatient  - If abdominal pain worsens, order urgent CT A&P w/wo po and IV contrast to r/o infection vs ischemia   - follows GI as o/p, Dr Bobo: [phone]    #gastroparesis  - monitor BMs  - start on appropriate bowel regimen if constipated     # DVT ppx- LMWH (dose blinded, enrolled in research)   # GI ppx- PPI  # Activity- as tolerated   # Diet- cc  # Code status full  # Dispo- to medicine floor

## 2021-01-05 NOTE — RESEARCH COMMUNICATION NOTE - NS AS RESEARCH STUDY NAME FT
HepCOVID Clinical Trial     For more information please refer to [facility]: Click on "Tools" then "COVID19 Treatment Protocols and Clinical Trials"

## 2021-01-05 NOTE — PATIENT PROFILE ADULT - CONTRAINDICATIONS & PRECAUTIONS (SELECT ALL THAT APPLY)
Faxed completed form to [phone].  
Form received.  Pending MD signature.   
Patient dropped off health forms to be completed by PCP.  
Patient/surrogate refused vaccine...

## 2021-01-06 LAB
ALBUMIN SERPL ELPH-MCNC: 2.6 G/DL — LOW (ref 3.5–5.2)
ALP SERPL-CCNC: 68 U/L — SIGNIFICANT CHANGE UP (ref 30–115)
ALT FLD-CCNC: 37 U/L — SIGNIFICANT CHANGE UP (ref 0–41)
ANION GAP SERPL CALC-SCNC: 12 MMOL/L — SIGNIFICANT CHANGE UP (ref 7–14)
AST SERPL-CCNC: 45 U/L — HIGH (ref 0–41)
BILIRUB SERPL-MCNC: 1.2 MG/DL — SIGNIFICANT CHANGE UP (ref 0.2–1.2)
BUN SERPL-MCNC: 15 MG/DL — SIGNIFICANT CHANGE UP (ref 10–20)
CALCIUM SERPL-MCNC: 8 MG/DL — LOW (ref 8.5–10.1)
CHLORIDE SERPL-SCNC: 98 MMOL/L — SIGNIFICANT CHANGE UP (ref 98–110)
CO2 SERPL-SCNC: 27 MMOL/L — SIGNIFICANT CHANGE UP (ref 17–32)
CREAT SERPL-MCNC: 0.6 MG/DL — LOW (ref 0.7–1.5)
GLUCOSE BLDC GLUCOMTR-MCNC: 149 MG/DL — HIGH (ref 70–99)
GLUCOSE BLDC GLUCOMTR-MCNC: 159 MG/DL — HIGH (ref 70–99)
GLUCOSE BLDC GLUCOMTR-MCNC: 191 MG/DL — HIGH (ref 70–99)
GLUCOSE BLDC GLUCOMTR-MCNC: 220 MG/DL — HIGH (ref 70–99)
GLUCOSE BLDC GLUCOMTR-MCNC: 296 MG/DL — HIGH (ref 70–99)
GLUCOSE SERPL-MCNC: 138 MG/DL — HIGH (ref 70–99)
HCT VFR BLD CALC: 42.7 % — SIGNIFICANT CHANGE UP (ref 42–52)
HGB BLD-MCNC: 14.3 G/DL — SIGNIFICANT CHANGE UP (ref 14–18)
MAGNESIUM SERPL-MCNC: 1.7 MG/DL — LOW (ref 1.8–2.4)
MCHC RBC-ENTMCNC: 30 PG — SIGNIFICANT CHANGE UP (ref 27–31)
MCHC RBC-ENTMCNC: 33.5 G/DL — SIGNIFICANT CHANGE UP (ref 32–37)
MCV RBC AUTO: 89.7 FL — SIGNIFICANT CHANGE UP (ref 80–94)
NRBC # BLD: 0 /100 WBCS — SIGNIFICANT CHANGE UP (ref 0–0)
PLATELET # BLD AUTO: 268 K/UL — SIGNIFICANT CHANGE UP (ref 130–400)
POTASSIUM SERPL-MCNC: 3.3 MMOL/L — LOW (ref 3.5–5)
POTASSIUM SERPL-SCNC: 3.3 MMOL/L — LOW (ref 3.5–5)
PROCALCITONIN SERPL-MCNC: 0.06 NG/ML — SIGNIFICANT CHANGE UP (ref 0.02–0.1)
PROT SERPL-MCNC: 5.5 G/DL — LOW (ref 6–8)
RBC # BLD: 4.76 M/UL — SIGNIFICANT CHANGE UP (ref 4.7–6.1)
RBC # FLD: 12.7 % — SIGNIFICANT CHANGE UP (ref 11.5–14.5)
SODIUM SERPL-SCNC: 137 MMOL/L — SIGNIFICANT CHANGE UP (ref 135–146)
WBC # BLD: 4.31 K/UL — LOW (ref 4.8–10.8)
WBC # FLD AUTO: 4.31 K/UL — LOW (ref 4.8–10.8)

## 2021-01-06 PROCEDURE — 99233 SBSQ HOSP IP/OBS HIGH 50: CPT

## 2021-01-06 RX ORDER — DEXAMETHASONE 0.5 MG/5ML
6 ELIXIR ORAL DAILY
Refills: 0 | Status: DISCONTINUED | OUTPATIENT
Start: 2021-01-06 | End: 2021-01-10

## 2021-01-06 RX ADMIN — Medication 7 UNIT(S): at 08:17

## 2021-01-06 RX ADMIN — Medication 7 UNIT(S): at 12:21

## 2021-01-06 RX ADMIN — PANTOPRAZOLE SODIUM 40 MILLIGRAM(S): 20 TABLET, DELAYED RELEASE ORAL at 05:28

## 2021-01-06 RX ADMIN — Medication 5 MILLIGRAM(S): at 05:28

## 2021-01-06 RX ADMIN — INSULIN GLARGINE 20 UNIT(S): 100 INJECTION, SOLUTION SUBCUTANEOUS at 09:55

## 2021-01-06 RX ADMIN — Medication 10 MILLIGRAM(S): at 21:05

## 2021-01-06 RX ADMIN — AMLODIPINE BESYLATE 5 MILLIGRAM(S): 2.5 TABLET ORAL at 05:28

## 2021-01-06 RX ADMIN — Medication 6 MILLIGRAM(S): at 09:56

## 2021-01-06 RX ADMIN — Medication 7 UNIT(S): at 17:15

## 2021-01-06 RX ADMIN — Medication 10 MILLIGRAM(S): at 05:28

## 2021-01-06 RX ADMIN — Medication 10 MILLIGRAM(S): at 17:13

## 2021-01-06 NOTE — CONSULT NOTE ADULT - SUBJECTIVE AND OBJECTIVE BOX
Request for consultation:  Requested by:    Patient is a 70y old  Male who presents with a chief complaint of weakness (06 Jan 2021 09:48)    HPI:  70 year old male with a PMhx of DMII, PUD.     CC: weakness and decreased PO intake    History goes back to: 2 days ago when patient started having decreased PO intake, and started feeling very fatigued. Patients weakness got worse and so he presented to the ED. As per son patient took out his pump and son was not there to re-attach it so patient sugars started increasing and he wasn;t very compliant with his diet so he presented with DKA    ROS is positive for: patient reports chills at home, pleuritic chest pain (only on coughing and taking a deep breath). Additionally patient reports some diffuse abdominal pain, and mild nausea.     ROS is negative for: no SOB, no palpitations. Patient denies change in urinary or bowel habits. No lower extremity edema /swelling     Of note: patient was diagnosed with COVID almost 10 days ago and he was feeling carrol until 2 days ago.     In the ED: vital signs WNL  Labs significant for: d-dimer 1700, b-oh:5, glucose: 361  Troponin: negative  CT A chest: No pulmonary embolus.  Scattered peripheral ground - glass opacities, compatible with viral/infectious etiology.  Multiple bilateral pulmonary nodules measuring up to 6 mm in the left upper lobe.  Patient admitted for DKA.      (03 Jan 2021 07:01)      FAMILY HISTORY:    PAST MEDICAL & SURGICAL HISTORY:  DM (diabetes mellitus)    No significant past surgical history    Allergies    No Known Allergies    Intolerances      MEDICATIONS  (STANDING):  amLODIPine   Tablet 5 milliGRAM(s) Oral daily  dexAMETHasone  Injectable 6 milliGRAM(s) IV Push daily  dextrose 40% Gel 15 Gram(s) Oral once  dextrose 5%. 1000 milliLiter(s) (50 mL/Hr) IV Continuous <Continuous>  dextrose 5%. 1000 milliLiter(s) (100 mL/Hr) IV Continuous <Continuous>  dextrose 50% Injectable 25 Gram(s) IV Push once  dextrose 50% Injectable 12.5 Gram(s) IV Push once  dextrose 50% Injectable 25 Gram(s) IV Push once  enalapril 5 milliGRAM(s) Oral daily  enoxaparin Study Injectable () 1 Dose(s) SubCutaneous two times a day  glucagon  Injectable 1 milliGRAM(s) IntraMuscular once  insulin glargine Injectable (LANTUS) 20 Unit(s) SubCutaneous every morning  insulin lispro Injectable (ADMELOG) 7 Unit(s) SubCutaneous three times a day before meals  metoclopramide 10 milliGRAM(s) Oral three times a day  pantoprazole    Tablet 40 milliGRAM(s) Oral before breakfast    MEDICATIONS  (PRN):      Vital Signs Last 24 Hrs  T(C): 36.8 (06 Jan 2021 05:48), Max: 36.8 (06 Jan 2021 05:48)  T(F): 98.2 (06 Jan 2021 05:48), Max: 98.2 (06 Jan 2021 05:48)  HR: 88 (06 Jan 2021 05:48) (76 - 96)  BP: 120/68 (06 Jan 2021 05:48) (115/57 - 170/87)  BP(mean): 101 (05 Jan 2021 14:00) (84 - 121)  RR: 18 (06 Jan 2021 05:48) (17 - 30)  SpO2: 96% (06 Jan 2021 05:48) (91% - 97%)  Height (cm): 172.7 (01-05 @ 16:23)  Weight (kg): 80.4 (01-05 @ 16:23)  BMI (kg/m2): 27 (01-05 @ 16:23)    PHYSICAL EXAM  All physical exam findings normal, except those marked:  General:	Alert, active, cooperative, NAD, well hydrated  Neck		Normal: supple, no cervical adenopathy, no palpable thyroid  Cardiovascular	Normal: regular rate, normal S1, S2, no murmurs  Respiratory	Normal: no chest wall deformity, normal respiratory pattern, CTA B/L  Abdominal	Normal: soft, ND, NT, bowel sounds present, no masses, no organomegaly  Extremities	Normal: FROM x4  Skin		Normal: intact and not indurated, no rash, no acanthosis nigricans  Neurologic	Normal: grossly intact    LABS                          14.3   4.31  )-----------( 268      ( 06 Jan 2021 05:44 )             42.7     01-06    137  |  98  |  15  ----------------------------<  138<H>  3.3<L>   |  27  |  0.6<L>    Ca    8.0<L>      06 Jan 2021 05:44  Phos  2.8     01-05  Mg     1.7     01-06    TPro  5.5<L>  /  Alb  2.6<L>  /  TBili  1.2  /  DBili  x   /  AST  45<H>  /  ALT  37  /  AlkPhos  68  01-06        CAPILLARY BLOOD GLUCOSE      POCT Blood Glucose.: 149 mg/dL (06 Jan 2021 07:55)  POCT Blood Glucose.: 133 mg/dL (05 Jan 2021 16:37)  POCT Blood Glucose.: 224 mg/dL (05 Jan 2021 11:36)     Patient is a 70y old  Male who presents with a chief complaint of weakness (06 Jan 2021 09:48)    HPI:  70 year old male with a PMhx of DMII, PUD.     CC: weakness and decreased PO intake    History goes back to: 2 days ago when patient started having decreased PO intake, and started feeling very fatigued. Patients weakness got worse and so he presented to the ED. As per son patient took out his pump and son was not there to re-attach it so patient sugars started increasing and he wasn;t very compliant with his diet so he presented with DKA    ROS is positive for: patient reports chills at home, pleuritic chest pain (only on coughing and taking a deep breath). Additionally patient reports some diffuse abdominal pain, and mild nausea.     ROS is negative for: no SOB, no palpitations. Patient denies change in urinary or bowel habits. No lower extremity edema /swelling     Of note: patient was diagnosed with COVID almost 10 days ago and he was feeling carrol until 2 days ago.     In the ED: vital signs WNL  Labs significant for: d-dimer 1700, b-oh:5, glucose: 361  Troponin: negative  CT A chest: No pulmonary embolus.  Scattered peripheral ground - glass opacities, compatible with viral/infectious etiology.  Multiple bilateral pulmonary nodules measuring up to 6 mm in the left upper lobe.  Patient admitted for DKA.      (03 Jan 2021 07:01)      FAMILY HISTORY:    PAST MEDICAL & SURGICAL HISTORY:  DM (diabetes mellitus)    No significant past surgical history    Allergies    No Known Allergies    Intolerances      MEDICATIONS  (STANDING):  amLODIPine   Tablet 5 milliGRAM(s) Oral daily  dexAMETHasone  Injectable 6 milliGRAM(s) IV Push daily  dextrose 40% Gel 15 Gram(s) Oral once  dextrose 5%. 1000 milliLiter(s) (50 mL/Hr) IV Continuous <Continuous>  dextrose 5%. 1000 milliLiter(s) (100 mL/Hr) IV Continuous <Continuous>  dextrose 50% Injectable 25 Gram(s) IV Push once  dextrose 50% Injectable 12.5 Gram(s) IV Push once  dextrose 50% Injectable 25 Gram(s) IV Push once  enalapril 5 milliGRAM(s) Oral daily  enoxaparin Study Injectable () 1 Dose(s) SubCutaneous two times a day  glucagon  Injectable 1 milliGRAM(s) IntraMuscular once  insulin glargine Injectable (LANTUS) 20 Unit(s) SubCutaneous every morning  insulin lispro Injectable (ADMELOG) 7 Unit(s) SubCutaneous three times a day before meals  metoclopramide 10 milliGRAM(s) Oral three times a day  pantoprazole    Tablet 40 milliGRAM(s) Oral before breakfast    MEDICATIONS  (PRN):      Vital Signs Last 24 Hrs  T(C): 36.8 (06 Jan 2021 05:48), Max: 36.8 (06 Jan 2021 05:48)  T(F): 98.2 (06 Jan 2021 05:48), Max: 98.2 (06 Jan 2021 05:48)  HR: 88 (06 Jan 2021 05:48) (76 - 96)  BP: 120/68 (06 Jan 2021 05:48) (115/57 - 170/87)  BP(mean): 101 (05 Jan 2021 14:00) (84 - 121)  RR: 18 (06 Jan 2021 05:48) (17 - 30)  SpO2: 96% (06 Jan 2021 05:48) (91% - 97%)  Height (cm): 172.7 (01-05 @ 16:23)  Weight (kg): 80.4 (01-05 @ 16:23)  BMI (kg/m2): 27 (01-05 @ 16:23)    PHYSICAL EXAM  All physical exam findings normal, except those marked:  General:	Alert, active, cooperative, NAD, well hydrated  Cardiovascular	Normal: regular rate, normal S1, S2, no murmurs  Respiratory	Normal: no chest wall deformity, normal respiratory pattern, CTA B/L  Abdominal	Normal: soft, ND, NT, bowel sounds present, no masses, no organomegaly  Neurologic	Normal: grossly intact    LABS                          14.3   4.31  )-----------( 268      ( 06 Jan 2021 05:44 )             42.7     01-06    137  |  98  |  15  ----------------------------<  138<H>  3.3<L>   |  27  |  0.6<L>    Ca    8.0<L>      06 Jan 2021 05:44  Phos  2.8     01-05  Mg     1.7     01-06    TPro  5.5<L>  /  Alb  2.6<L>  /  TBili  1.2  /  DBili  x   /  AST  45<H>  /  ALT  37  /  AlkPhos  68  01-06        CAPILLARY BLOOD GLUCOSE      POCT Blood Glucose.: 149 mg/dL (06 Jan 2021 07:55)  POCT Blood Glucose.: 133 mg/dL (05 Jan 2021 16:37)  POCT Blood Glucose.: 224 mg/dL (05 Jan 2021 11:36)

## 2021-01-06 NOTE — CONSULT NOTE ADULT - ASSESSMENT
70 year old male with hx of type 2 DM on insulin pump presented with DKA after being off insulin for 2 weeks   HBA1C noted 11.9, patient on lantus 20, lispro 7 TID, fasting and post-prandial sugars at target today    - 70 year old male with hx of type 2 DM on insulin pump presented with DKA after being off insulin for 2 weeks   HBA1C noted 11.9, patient on lantus 20, lispro 7 TID, fasting and post-prandial sugars at target today    -Medications on discharge   basal bolus therapy with lantus 20 , and lispro 7 TID   discontinue insulin pump as HBA1C elevated and will benefit better from basal bolus coverage     -if patient insisting on pump usage, follow up with his endocrinologist outpatient for set up and correction of pump, in meantime discharge on basal bolus therapy  70 year old male with hx of type 2 DM on insulin pump presented with DKA after being off insulin for 2 weeks   HBA1C noted 11.9, patient on lantus 20, lispro 7 TID, fasting and post-prandial sugars at target today    -Medications on discharge   basal bolus therapy with lantus 20 , and lispro 7 TID   discontinue insulin pump as HBA1C elevated and will benefit better from basal bolus coverage     -if patient insisting on pump usage, follow up with his endocrinologist outpatient for set up and correction of pump, in meantime discharge on basal bolus therapy     ATTNG-agree fully with assessment/recommendations as above-pt will need to reevaluate pump therapy with his endocrinologist as outpatient.

## 2021-01-06 NOTE — RESEARCH COMMUNICATION NOTE - NS AS RESEARCH COMMUNICATION NOTE FT
This is a reminder that patient is currently enrolled in HepCOVID clinical trial and receiving a blinded (study) dose of Low-Molecular Weight Heparin (Lovenox).     No events related to the study drug reported.     For any issue please reach out to the research department (Meenakshi [phone] or Teams) and Dr Lema ([phone] or Teams). This is a reminder that patient is currently enrolled in HepCOVID clinical trial and receiving a blinded (study) dose of Low-Molecular Weight Heparin (Lovenox).     No events related to the study drug reported.     REMINDER TO PRIMARY TEAM: PLEASE OBTAIN A LOWER EXTREMITIES VENOUS DUPLEX ON DAY OF DISCHARGE (can be ordered the day before discharge)    For any issue please reach out to the research department (Meenakshi [phone] or Teams) and Dr Lema ([phone] or Teams).

## 2021-01-06 NOTE — PROGRESS NOTE ADULT - ASSESSMENT
69 yo M PMHx DM and PUD, tested covid positive 10 days prior to admission, presented with weakness, decreased PO intake, pleuritic chest pain and abdominal pain of 2 days duration. Pt intially admitted to CEU for management of DKA then downgraded to floor.    # Diabetic Keto Acidosis   - Resolved, s/p insulin drip in CEU, was likely precipitated by viral infection (COVID)  - as per pts son, pt recently started on insulin pump but has not taken insulin for past 2 weeks since he was home with covid and his son, who normally aids in his care, was unable to visit him.   - on admissions B-OH=5, AG=24  - Uncontrolled Diabetes, A1C 11.9%  - c/w lantus 20, lispro 7/7/7 with +1 sliding scale  - Endo eval appreciated, will d/c insulin pump on discharge and c/w same coverage as inpatient (if patient insisting on pump usage, follow up with his endocrinologist outpatient for set up and correction of pump)  - monitor FS    # Acute hypoxic respiratory failure 2/2 COVID PNA  - 1/6 spO2 96% on 2L NC (was on RA on admission)  - CXR 1/3: Left lung base pulmonary opacity/pleural effusion, repeat CXR 1/5 decreased opacity  - CTA chest 1/3: Scattered bilateral peripheral ground glass opacities, multiple bilateral pulmonary nodules up to 6 mm in the lt upper lobe.   - Dimer 1784-->848, ferritin 899-->783, CRP 5.1-->3.22, procal 0.11-->0.06   - c/w decadron 6 mg IV daily for 10 days (start date 1/6)  - c/w Lovenox study dose (duplex needed before d/c, done on 1/5, negative for DVT)    # Multiple lung nodules  - Incidental finding on CT scan  - Measuring 6mm in left upper lobe  - f/u o/p in 12 months for f/u CT    # Low suspicion for pericarditis  - pleuritic chest pain on admission   - trops (-) x2  - echo can't be done given COVID positive    # Hypertension  - Controlled   - c/w enalapril 5 mg daily and amlodipine 5 mg daily    # Hx of PUD  - takes celebrex at home  - had LLQ abdominal pain on admission (possibly due to DKA)  - c/w protonix 40 mg daily  - If abdominal pain worsens, order urgent CT A&P w/wo po and IV contrast to r/o infection vs ischemia   - follows GI as o/p, Dr Bobo: [phone]    # Gastroparesis With Hiccups   - on reglan    # Misc:  DVT ppx: lovenox (dose blinded, enrolled in research)   GI ppx: PPI  Diet: carb consistent  Activity: IAT tolerated   Code status: Full    #Progress Note Handoff  Pending (specify):  Clinical improvement / Stability   Disposition: Home

## 2021-01-06 NOTE — CHART NOTE - NSCHARTNOTEFT_GEN_A_CORE
I made rounds today with the treatment team including the hospitalist, residents,  nurses and  and discussed the patient's current medical status and discharge  planning needs, and reviewed the chart.    T(C): 36.8 (01-06-21 @ 05:48), Max: 36.8 (01-06-21 @ 05:48)  HR: 88 (01-06-21 @ 05:48) (76 - 90)  BP: 120/68 (01-06-21 @ 05:48) (115/57 - 148/70)  RR: 18 (01-06-21 @ 05:48) (17 - 25)  SpO2: 96% (01-06-21 @ 05:48) (91% - 97%)          I reached out to the patient's health care proxy/ responsible family member-           [     ]  I reached                                     and discussed the patient's medical condition,                   family concerns, and discharge planning           [     ]  I left a message with family               [  x   ]  I personally participated in rounds with the medical team and my resident and discussed the case. My resident reached                   family member/ HCP   Lena ( cousin ) [phone]   under my direction and supervision  and we reviewed the conversation          [     ]  My resident left a message with family under my direction and supervision                [     ]   My resident attended rounds and called the family     The following was discussed: updated medical status          [     ] I spent 5-10 minutes on the above discussing medical issues with team members and family and/ or my resident    [  x   ] I spent 11-20 minutes on the above discussing medical issues with team members and family and/ or my resident    [     ] I spent 21-30 minutes on the above discussing medical issues with team members and family and/ or my resident

## 2021-01-06 NOTE — PROGRESS NOTE ADULT - SUBJECTIVE AND OBJECTIVE BOX
MEERA RADER  70y  Male      Patient is a 70y old  Male who presents with a chief complaint of weakness.      INTERVAL HPI/OVERNIGHT EVENTS: The patient was seen at bedside. Resting comfortably in bed.   c/o hiccups .       ******************************* REVIEW OF SYSTEMS:**********************************************    All other review of systems negative    *********************** VITALS ******************************************    T(F): 96.9 (01-06-21 @ 13:00)  HR: 86 (01-06-21 @ 13:00) (76 - 88)  BP: 121/57 (01-06-21 @ 13:00) (115/57 - 134/60)  RR: 20 (01-06-21 @ 13:00) (18 - 20)  SpO2: 96% (01-06-21 @ 13:00) (94% - 97%)    01-05-21 @ 07:01  -  01-06-21 @ 07:00  --------------------------------------------------------  IN: 240 mL / OUT: 700 mL / NET: -460 mL            01-05-21 @ 07:01  -  01-06-21 @ 07:00  --------------------------------------------------------  IN: 240 mL / OUT: 700 mL / NET: -460 mL        ******************************** PHYSICAL EXAM:**************************************************  GENERAL: NAD    PSYCH: no agitation, baseline mentation  HEENT:     NERVOUS SYSTEM:  Alert & Oriented X3, MS  5/5 B/L  UE and LE ; Sensory intact    PULMONARY: TAM, CTA    CARDIOVASCULAR: S1S2 RRR    GI: Soft, NT, ND; BS present.    EXTREMITIES:  2+ Peripheral Pulses, No clubbing, cyanosis, or edema    LYMPH: No lymphadenopathy noted    SKIN: No rashes or lesions      **************************** LABS *******************************************************                          14.3   4.31  )-----------( 268      ( 06 Jan 2021 05:44 )             42.7     01-06    137  |  98  |  15  ----------------------------<  138<H>  3.3<L>   |  27  |  0.6<L>    Ca    8.0<L>      06 Jan 2021 05:44  Phos  2.8     01-05  Mg     1.7     01-06    TPro  5.5<L>  /  Alb  2.6<L>  /  TBili  1.2  /  DBili  x   /  AST  45<H>  /  ALT  37  /  AlkPhos  68  01-06        PT/INR - ( 04 Jan 2021 16:55 )   PT: 12.70 sec;   INR: 1.10 ratio           Lactate Trend        CAPILLARY BLOOD GLUCOSE      POCT Blood Glucose.: 191 mg/dL (06 Jan 2021 12:18)          **************************Active Medications *******************************************  No Known Allergies      amLODIPine   Tablet 5 milliGRAM(s) Oral daily  dexAMETHasone  Injectable 6 milliGRAM(s) IV Push daily  dextrose 40% Gel 15 Gram(s) Oral once  dextrose 5%. 1000 milliLiter(s) IV Continuous <Continuous>  dextrose 5%. 1000 milliLiter(s) IV Continuous <Continuous>  dextrose 50% Injectable 25 Gram(s) IV Push once  dextrose 50% Injectable 12.5 Gram(s) IV Push once  dextrose 50% Injectable 25 Gram(s) IV Push once  enalapril 5 milliGRAM(s) Oral daily  enoxaparin Study Injectable () 1 Dose(s) SubCutaneous two times a day  glucagon  Injectable 1 milliGRAM(s) IntraMuscular once  insulin glargine Injectable (LANTUS) 20 Unit(s) SubCutaneous every morning  insulin lispro Injectable (ADMELOG) 7 Unit(s) SubCutaneous three times a day before meals  metoclopramide 10 milliGRAM(s) Oral three times a day  pantoprazole    Tablet 40 milliGRAM(s) Oral before breakfast      ***************************************************  RADIOLOGY & ADDITIONAL TESTS:    Imaging Personally Reviewed:  [ ] YES  [ ] NO    HEALTH ISSUES - PROBLEM Dx:

## 2021-01-06 NOTE — PROGRESS NOTE ADULT - SUBJECTIVE AND OBJECTIVE BOX
SUBJECTIVE:    Patient is a 70y old Male who presents with a chief complaint of weakness (05 Jan 2021 09:47)    Currently admitted to medicine with the primary diagnosis of DKA (diabetic ketoacidoses)       Today is hospital day 3d. This morning he is resting comfortably in bed and reports no new issues or overnight events.     PAST MEDICAL & SURGICAL HISTORY  DM (diabetes mellitus)    No significant past surgical history      SOCIAL HISTORY:  Negative for smoking/alcohol/drug use.     ALLERGIES:  No Known Allergies    MEDICATIONS:  STANDING MEDICATIONS  amLODIPine   Tablet 5 milliGRAM(s) Oral daily  dexAMETHasone  Injectable 6 milliGRAM(s) IV Push daily  dextrose 40% Gel 15 Gram(s) Oral once  dextrose 5%. 1000 milliLiter(s) IV Continuous <Continuous>  dextrose 5%. 1000 milliLiter(s) IV Continuous <Continuous>  dextrose 50% Injectable 25 Gram(s) IV Push once  dextrose 50% Injectable 12.5 Gram(s) IV Push once  dextrose 50% Injectable 25 Gram(s) IV Push once  enalapril 5 milliGRAM(s) Oral daily  enoxaparin Study Injectable () 1 Dose(s) SubCutaneous two times a day  glucagon  Injectable 1 milliGRAM(s) IntraMuscular once  insulin glargine Injectable (LANTUS) 20 Unit(s) SubCutaneous every morning  insulin lispro Injectable (ADMELOG) 7 Unit(s) SubCutaneous three times a day before meals  metoclopramide 10 milliGRAM(s) Oral three times a day  pantoprazole    Tablet 40 milliGRAM(s) Oral before breakfast    PRN MEDICATIONS    VITALS:   T(F): 98.2  HR: 88  BP: 120/68  RR: 18  SpO2: 96%    LABS:                        14.3   4.31  )-----------( 268      ( 06 Jan 2021 05:44 )             42.7     01-06    137  |  98  |  15  ----------------------------<  138<H>  3.3<L>   |  27  |  0.6<L>    Ca    8.0<L>      06 Jan 2021 05:44  Phos  2.8     01-05  Mg     1.7     01-06    TPro  5.5<L>  /  Alb  2.6<L>  /  TBili  1.2  /  DBili  x   /  AST  45<H>  /  ALT  37  /  AlkPhos  68  01-06    PT/INR - ( 04 Jan 2021 16:55 )   PT: 12.70 sec;   INR: 1.10 ratio                       RADIOLOGY:    PHYSICAL EXAM:  GEN: No acute distress  LUNGS: Clear to auscultation bilaterally   HEART: Regular  ABD: Soft, non-tender, non-distended.  EXT: NC/NC/NE/2+PP/TREVIZO/Skin Intact.   NEURO: AAOX3    Intravenous access:   NG tube:   Aguirre Catheter:        SUBJECTIVE:    Patient is a 70y old Male who presents with a chief complaint of weakness (05 Jan 2021 09:47)    Currently admitted to medicine with the primary diagnosis of DKA (diabetic ketoacidoses)       Today is hospital day 3d. This morning he is resting comfortably in bed and reports no new issues or overnight events.     PAST MEDICAL & SURGICAL HISTORY  DM (diabetes mellitus)    No significant past surgical history      SOCIAL HISTORY:  Negative for smoking/alcohol/drug use.     ALLERGIES:  No Known Allergies    MEDICATIONS:  STANDING MEDICATIONS  amLODIPine   Tablet 5 milliGRAM(s) Oral daily  dexAMETHasone  Injectable 6 milliGRAM(s) IV Push daily  dextrose 40% Gel 15 Gram(s) Oral once  dextrose 5%. 1000 milliLiter(s) IV Continuous <Continuous>  dextrose 5%. 1000 milliLiter(s) IV Continuous <Continuous>  dextrose 50% Injectable 25 Gram(s) IV Push once  dextrose 50% Injectable 12.5 Gram(s) IV Push once  dextrose 50% Injectable 25 Gram(s) IV Push once  enalapril 5 milliGRAM(s) Oral daily  enoxaparin Study Injectable () 1 Dose(s) SubCutaneous two times a day  glucagon  Injectable 1 milliGRAM(s) IntraMuscular once  insulin glargine Injectable (LANTUS) 20 Unit(s) SubCutaneous every morning  insulin lispro Injectable (ADMELOG) 7 Unit(s) SubCutaneous three times a day before meals  metoclopramide 10 milliGRAM(s) Oral three times a day  pantoprazole    Tablet 40 milliGRAM(s) Oral before breakfast    PRN MEDICATIONS    VITALS:   T(F): 98.2  HR: 88  BP: 120/68  RR: 18  SpO2: 96%    LABS:                        14.3   4.31  )-----------( 268      ( 06 Jan 2021 05:44 )             42.7     01-06    137  |  98  |  15  ----------------------------<  138<H>  3.3<L>   |  27  |  0.6<L>    Ca    8.0<L>      06 Jan 2021 05:44  Phos  2.8     01-05  Mg     1.7     01-06    TPro  5.5<L>  /  Alb  2.6<L>  /  TBili  1.2  /  DBili  x   /  AST  45<H>  /  ALT  37  /  AlkPhos  68  01-06    PT/INR - ( 04 Jan 2021 16:55 )   PT: 12.70 sec;   INR: 1.10 ratio           RADIOLOGY:  Xray Chest 1/3:  Left lung base pulmonary opacity/pleural effusion.    Xray Chest 1/5:  Decreased left lung opacity/effusion.    CT Angio Chest PE Protocol w/ IV Cont 1/3:  No pulmonary embolus.  Scattered bilateral peripheral groundglass opacities, compatible with infectious/inflammatory etiology (Covid pneumonia).  Multiple bilateral pulmonary nodules measuring up to 6 mm in the left upper lobe. Recommend a 12 month follow-up chest CT.    VA Duplex Lower Ext Vein Scan, Bilat 1/5:  Noevidence of deep venous thrombosis or superficial thrombophlebitis in the bilateral lower extremities.      PHYSICAL EXAM:  GEN: No acute distress  LUNGS: Clear to auscultation bilaterally   HEART: Regular  ABD: Soft, non-tender, non-distended.  EXT: No edema   NEURO: AAOX3

## 2021-01-06 NOTE — PROGRESS NOTE ADULT - ASSESSMENT
#DM with DKA - precipitated by viral infection  - as per pts son, pt recently started on insulin pump but has not taken insulin for past 2 weeks since he was home with covid and his son, who normally aids in his care, was unable to visit him.   - on admissions B-OH=5, AG=22  - started on insulin drip, AG closed, bridged to insulin sub q, started on PO diet  - a1c 11.9, fs still elevated in the 200s  - c/w FS q6hrs  - c/w lantus 20, lispro 7/7/7 with +1 sliding scale  - adjust accordingly     #COVID PNA  - CTA chest: Scattered bilateral peripheral groundglass opacities, compatible with infectious/inflammatory etiology (Covid pneumonia).  Multiple bilateral pulmonary nodules measuring up to 6 mm in the left upper lobe. Recommend a 12 month follow-up chest CT.  - follow up inflammatory markers (procal, crp, ferritin)   - no remdesivir given onset of symptoms   - Low suspicion for concomitant bacterial PNA so will hold AN-B  - c/w Lovenox 40 daily   - Was on RA at admission, now requires O2 (2L NC)  SpO2  - DDimer 848, Fibrinogen >700,   - Start dexa q24hrs  - if worsening, ID eval    #Lung nodules found on CT  - Meassuring 6mm in left  upper lobe  - f/u o/p in 12 months for f/u CT    # possible pericarditis?  - trops (-) x2  - f/u TTE    #Hx of Peptic ulcer  # Divirticulitis   # Chronic hiccups  - takes celebrex at home, d/c home med  - has abdominal pain on palpation in the LLQ  - takes esomeprazole at home  - start on PPI, monitor for worsening of symptoms as inpatient  - If abdominal pain worsens, order urgent CT A&P w/wo po and IV contrast to r/o infection vs ischemia   - follows GI as o/p, Dr Bobo: [phone]    #gastroparesis  - monitor BMs  - start on appropriate bowel regimen if constipated     # DVT ppx- LMWH (dose blinded, enrolled in research)   # GI ppx- PPI  # Activity- as tolerated   # Diet- cc  # Code status full  # Dispo- to medicine floor   69 yo M PMHx DM and PUD, tested covid positive 10 days prior to admission, presented with weakness, decreased PO intake, pleuritic chest pain and abdominal pain of 2 days duration. Pt intially admitted to CEU for management of DKA then downgraded to floor.    # DKA  - Resolved, s/p insulin drip in CEU, was likely precipitated by viral infection (COVID)  - as per pts son, pt recently started on insulin pump but has not taken insulin for past 2 weeks since he was home with covid and his son, who normally aids in his care, was unable to visit him.   - on admissions B-OH=5, AG=24  - Uncontrolled Diabetes, A1C 11.9%  - c/w lantus 20, lispro 7/7/7 with +1 sliding scale  - Endo eval appreciated, will d/c insulin pump on discharge and c/w same coverage as inpatient (if patient insisting on pump usage, follow up with his endocrinologist outpatient for set up and correction of pump)  - monitor FS    # Acute hypoxic respiratory failure 2/2 COVID PNA  - 1/6 spO2 96% on 2L NC (was on RA on admission)  - CXR 1/3: Left lung base pulmonary opacity/pleural effusion, repeat CXR 1/5 decreased opacity  - CTA chest 1/3: Scattered bilateral peripheral ground glass opacities, multiple bilateral pulmonary nodules up to 6 mm in the lt upper lobe.   - Dimer 1784-->848, ferritin 899-->783, CRP 5.1-->3.22, procal 0.11-->0.06   - c/w decadron 6 mg IV daily for 10 days (start date 1/6)  - c/w Lovenox study dose (duplex needed before d/c, done on 1/5, negative for COVID)    # Multiple lung nodules  - Incidental finding on CT scan  - Measuring 6mm in left upper lobe  - f/u o/p in 12 months for f/u CT    # Low suspicion for pericarditis  - pleuritic chest pain on admission   - trops (-) x2  - echo can't be done given COVID positive    # Hypertension  - Controlled   - c/w enalapril 5 mg daily and amlodipine 5 mg daily    # Hx of PUD  - takes celebrex at home  - had LLQ abdominal pain on admission (possibly due to DKA)  - c/w protonix 40 mg daily  - If abdominal pain worsens, order urgent CT A&P w/wo po and IV contrast to r/o infection vs ischemia   - follows GI as o/p, Dr Bobo: [phone]    # Gastroparesis  - monitor BMs  - start on appropriate bowel regimen if constipated     # Misc:  DVT ppx: lovenox (dose blinded, enrolled in research)   GI ppx: PPI  Diet: carb consistent  Activity: IAT tolerated   Code status: Full  Dispo: Acute

## 2021-01-07 LAB
ANION GAP SERPL CALC-SCNC: 13 MMOL/L — SIGNIFICANT CHANGE UP (ref 7–14)
BASOPHILS # BLD AUTO: 0 K/UL — SIGNIFICANT CHANGE UP (ref 0–0.2)
BASOPHILS NFR BLD AUTO: 0 % — SIGNIFICANT CHANGE UP (ref 0–1)
BLASTS # FLD: 0.9 % — HIGH (ref 0–0)
BUN SERPL-MCNC: 15 MG/DL — SIGNIFICANT CHANGE UP (ref 10–20)
CALCIUM SERPL-MCNC: 8.4 MG/DL — LOW (ref 8.5–10.1)
CHLORIDE SERPL-SCNC: 94 MMOL/L — LOW (ref 98–110)
CO2 SERPL-SCNC: 25 MMOL/L — SIGNIFICANT CHANGE UP (ref 17–32)
CREAT SERPL-MCNC: 0.6 MG/DL — LOW (ref 0.7–1.5)
EOSINOPHIL # BLD AUTO: 0 K/UL — SIGNIFICANT CHANGE UP (ref 0–0.7)
EOSINOPHIL NFR BLD AUTO: 0 % — SIGNIFICANT CHANGE UP (ref 0–8)
GIANT PLATELETS BLD QL SMEAR: PRESENT — SIGNIFICANT CHANGE UP
GLUCOSE BLDC GLUCOMTR-MCNC: 272 MG/DL — HIGH (ref 70–99)
GLUCOSE BLDC GLUCOMTR-MCNC: 285 MG/DL — HIGH (ref 70–99)
GLUCOSE BLDC GLUCOMTR-MCNC: 288 MG/DL — HIGH (ref 70–99)
GLUCOSE BLDC GLUCOMTR-MCNC: 311 MG/DL — HIGH (ref 70–99)
GLUCOSE BLDC GLUCOMTR-MCNC: 380 MG/DL — HIGH (ref 70–99)
GLUCOSE BLDC GLUCOMTR-MCNC: 413 MG/DL — HIGH (ref 70–99)
GLUCOSE BLDC GLUCOMTR-MCNC: 418 MG/DL — HIGH (ref 70–99)
GLUCOSE BLDC GLUCOMTR-MCNC: 438 MG/DL — HIGH (ref 70–99)
GLUCOSE SERPL-MCNC: 338 MG/DL — HIGH (ref 70–99)
HCT VFR BLD CALC: 44.1 % — SIGNIFICANT CHANGE UP (ref 42–52)
HGB BLD-MCNC: 15.1 G/DL — SIGNIFICANT CHANGE UP (ref 14–18)
LYMPHOCYTES # BLD AUTO: 0.33 K/UL — LOW (ref 1.2–3.4)
LYMPHOCYTES # BLD AUTO: 6.9 % — LOW (ref 20.5–51.1)
MAGNESIUM SERPL-MCNC: 1.7 MG/DL — LOW (ref 1.8–2.4)
MANUAL SMEAR VERIFICATION: SIGNIFICANT CHANGE UP
MCHC RBC-ENTMCNC: 29.7 PG — SIGNIFICANT CHANGE UP (ref 27–31)
MCHC RBC-ENTMCNC: 34.2 G/DL — SIGNIFICANT CHANGE UP (ref 32–37)
MCV RBC AUTO: 86.6 FL — SIGNIFICANT CHANGE UP (ref 80–94)
MONOCYTES # BLD AUTO: 0.34 K/UL — SIGNIFICANT CHANGE UP (ref 0.1–0.6)
MONOCYTES NFR BLD AUTO: 7 % — SIGNIFICANT CHANGE UP (ref 1.7–9.3)
NEUTROPHILS # BLD AUTO: 3.49 K/UL — SIGNIFICANT CHANGE UP (ref 1.4–6.5)
NEUTROPHILS NFR BLD AUTO: 72.2 % — SIGNIFICANT CHANGE UP (ref 42.2–75.2)
PLAT MORPH BLD: NORMAL — SIGNIFICANT CHANGE UP
PLATELET # BLD AUTO: 284 K/UL — SIGNIFICANT CHANGE UP (ref 130–400)
POTASSIUM SERPL-MCNC: 4.2 MMOL/L — SIGNIFICANT CHANGE UP (ref 3.5–5)
POTASSIUM SERPL-SCNC: 4.2 MMOL/L — SIGNIFICANT CHANGE UP (ref 3.5–5)
PROT C ACT/NOR PPP: 92 % — SIGNIFICANT CHANGE UP (ref 65–129)
PROT S FREE PPP-ACNC: 37 % — LOW (ref 63–140)
RBC # BLD: 5.09 M/UL — SIGNIFICANT CHANGE UP (ref 4.7–6.1)
RBC # FLD: 12.5 % — SIGNIFICANT CHANGE UP (ref 11.5–14.5)
RBC BLD AUTO: NORMAL — SIGNIFICANT CHANGE UP
SODIUM SERPL-SCNC: 132 MMOL/L — LOW (ref 135–146)
VARIANT LYMPHS # BLD: 13 % — HIGH (ref 0–5)
WBC # BLD: 4.83 K/UL — SIGNIFICANT CHANGE UP (ref 4.8–10.8)
WBC # FLD AUTO: 4.83 K/UL — SIGNIFICANT CHANGE UP (ref 4.8–10.8)

## 2021-01-07 PROCEDURE — 93010 ELECTROCARDIOGRAM REPORT: CPT

## 2021-01-07 PROCEDURE — 99222 1ST HOSP IP/OBS MODERATE 55: CPT

## 2021-01-07 PROCEDURE — 99233 SBSQ HOSP IP/OBS HIGH 50: CPT

## 2021-01-07 RX ORDER — INSULIN LISPRO 100/ML
9 VIAL (ML) SUBCUTANEOUS
Refills: 0 | Status: DISCONTINUED | OUTPATIENT
Start: 2021-01-07 | End: 2021-01-08

## 2021-01-07 RX ORDER — INSULIN LISPRO 100/ML
VIAL (ML) SUBCUTANEOUS
Refills: 0 | Status: DISCONTINUED | OUTPATIENT
Start: 2021-01-07 | End: 2021-01-10

## 2021-01-07 RX ORDER — MAGNESIUM SULFATE 500 MG/ML
2 VIAL (ML) INJECTION ONCE
Refills: 0 | Status: COMPLETED | OUTPATIENT
Start: 2021-01-07 | End: 2021-01-07

## 2021-01-07 RX ORDER — INSULIN HUMAN 100 [IU]/ML
8 INJECTION, SOLUTION SUBCUTANEOUS ONCE
Refills: 0 | Status: COMPLETED | OUTPATIENT
Start: 2021-01-07 | End: 2021-01-07

## 2021-01-07 RX ORDER — INSULIN GLARGINE 100 [IU]/ML
27 INJECTION, SOLUTION SUBCUTANEOUS EVERY MORNING
Refills: 0 | Status: DISCONTINUED | OUTPATIENT
Start: 2021-01-08 | End: 2021-01-08

## 2021-01-07 RX ORDER — INSULIN LISPRO 100/ML
4 VIAL (ML) SUBCUTANEOUS ONCE
Refills: 0 | Status: COMPLETED | OUTPATIENT
Start: 2021-01-07 | End: 2021-01-07

## 2021-01-07 RX ORDER — INSULIN GLARGINE 100 [IU]/ML
7 INJECTION, SOLUTION SUBCUTANEOUS ONCE
Refills: 0 | Status: COMPLETED | OUTPATIENT
Start: 2021-01-07 | End: 2021-01-07

## 2021-01-07 RX ORDER — INSULIN LISPRO 100/ML
7 VIAL (ML) SUBCUTANEOUS
Refills: 0 | Status: DISCONTINUED | OUTPATIENT
Start: 2021-01-07 | End: 2021-01-07

## 2021-01-07 RX ORDER — INSULIN GLARGINE 100 [IU]/ML
20 INJECTION, SOLUTION SUBCUTANEOUS AT BEDTIME
Refills: 0 | Status: DISCONTINUED | OUTPATIENT
Start: 2021-01-07 | End: 2021-01-07

## 2021-01-07 RX ADMIN — Medication 10 MILLIGRAM(S): at 05:52

## 2021-01-07 RX ADMIN — Medication 4 UNIT(S): at 22:32

## 2021-01-07 RX ADMIN — INSULIN GLARGINE 7 UNIT(S): 100 INJECTION, SOLUTION SUBCUTANEOUS at 15:18

## 2021-01-07 RX ADMIN — Medication 7 UNIT(S): at 11:55

## 2021-01-07 RX ADMIN — AMLODIPINE BESYLATE 5 MILLIGRAM(S): 2.5 TABLET ORAL at 05:52

## 2021-01-07 RX ADMIN — Medication 9 UNIT(S): at 16:13

## 2021-01-07 RX ADMIN — INSULIN GLARGINE 20 UNIT(S): 100 INJECTION, SOLUTION SUBCUTANEOUS at 07:49

## 2021-01-07 RX ADMIN — Medication 50 GRAM(S): at 18:53

## 2021-01-07 RX ADMIN — Medication 10 MILLIGRAM(S): at 12:04

## 2021-01-07 RX ADMIN — Medication 5 MILLIGRAM(S): at 05:52

## 2021-01-07 RX ADMIN — Medication 7 UNIT(S): at 07:48

## 2021-01-07 RX ADMIN — Medication 6 MILLIGRAM(S): at 05:52

## 2021-01-07 RX ADMIN — INSULIN HUMAN 8 UNIT(S): 100 INJECTION, SOLUTION SUBCUTANEOUS at 13:56

## 2021-01-07 RX ADMIN — PANTOPRAZOLE SODIUM 40 MILLIGRAM(S): 20 TABLET, DELAYED RELEASE ORAL at 05:52

## 2021-01-07 RX ADMIN — Medication 12: at 11:55

## 2021-01-07 RX ADMIN — Medication 10 MILLIGRAM(S): at 21:13

## 2021-01-07 NOTE — RESEARCH COMMUNICATION NOTE - NS AS RESEARCH COMMUNICATION NOTE FT
REMINDER TO PRIMARY TEAM: PLEASE OBTAIN A LOWER EXTREMITIES VENOUS DUPLEX ON DAY OF DISCHARGE     NO events related to LOVENOX    For outpatient DVT Prophylaxis regimen at the time of discharge, please refer to the most current guidelines regarding Extended DVT Prophylaxis for COVID19 patients: SUNRISE ---> TOOLS (top bar) --->COV19 Treatement Protocols.     Discussed with medical team

## 2021-01-07 NOTE — PROGRESS NOTE ADULT - ASSESSMENT
69 yo M PMHx DM and PUD, tested covid positive 10 days prior to admission, presented with weakness, decreased PO intake, pleuritic chest pain and abdominal pain of 2 days duration. Pt intially admitted to CEU for management of DKA then downgraded to floor.    # DKA  - Resolved, s/p insulin drip in CEU, was likely precipitated by viral infection (COVID)  - as per pts son, pt recently started on insulin pump but has not taken insulin for past 2 weeks since he was home with covid and his son, who normally aids in his care, was unable to visit him.   - on admissions B-OH=5, AG=24  - Uncontrolled Diabetes, A1C 11.9%  - c/w lantus 20, lispro 7/7/7 with +1 sliding scale  - Endo eval appreciated, will d/c insulin pump on discharge and c/w same coverage as inpatient (if patient insisting on pump usage, follow up with his endocrinologist outpatient for set up and correction of pump)  - monitor FS    # Acute hypoxic respiratory failure 2/2 COVID PNA  - 1/7 spO2 95% on 3L NC (was on RA on admission)  - CXR 1/3: Left lung base pulmonary opacity/pleural effusion, repeat CXR 1/5 decreased opacity  - CTA chest 1/3: Scattered bilateral peripheral ground glass opacities, multiple bilateral pulmonary nodules up to 6 mm in the lt upper lobe.   - Dimer 1784-->848, ferritin 899-->783, CRP 5.1-->3.22, procal 0.11-->0.06   - c/w decadron 6 mg IV daily for 10 days (start date 1/6)  - c/w Lovenox study dose (duplex needed before d/c, done on 1/5, negative for COVID)    # Multiple lung nodules  - Incidental finding on CT scan  - Measuring 6mm in left upper lobe  - f/u o/p in 12 months for f/u CT    # Low suspicion for pericarditis  - pleuritic chest pain on admission   - trops (-) x2  - echo can't be done given COVID positive    # Hypertension  - Controlled   - c/w enalapril 5 mg daily and amlodipine 5 mg daily    # Hx of PUD  - takes celebrex at home  - had LLQ abdominal pain on admission (possibly due to DKA)  - c/w protonix 40 mg daily  - If abdominal pain worsens, order urgent CT A&P w/wo po and IV contrast to r/o infection vs ischemia   - follows GI as o/p, Dr Bobo: [phone]    # Gastroparesis  - monitor BMs  - start on appropriate bowel regimen if constipated     # Misc:  DVT ppx: lovenox (dose blinded, enrolled in research)   GI ppx: PPI  Diet: carb consistent  Activity: IAT tolerated   Code status: Full  Dispo: anticipated for d/c 1/7   71 yo M PMHx DM and PUD, tested covid positive 10 days prior to admission, presented with weakness, decreased PO intake, pleuritic chest pain and abdominal pain of 2 days duration. Pt intially admitted to CEU for management of DKA then downgraded to floor.    # DKA  - Resolved, s/p insulin drip in CEU, was likely precipitated by viral infection (COVID)  - as per pts son, pt recently started on insulin pump but has not taken insulin for past 2 weeks since he was home with covid and his son, who normally aids in his care, was unable to visit him.   - on admissions B-OH=5, AG=24  - Uncontrolled Diabetes, A1C 11.9%  - c/w lantus 20, lispro 7/7/7 with +1 sliding scale  - Endo eval appreciated, will d/c insulin pump on discharge and c/w same coverage as inpatient (if patient insisting on pump usage, follow up with his endocrinologist outpatient for set up and correction of pump)  - monitor FS    # Acute hypoxic respiratory failure 2/2 COVID PNA  - 1/7 spO2 95% on 3L NC (was on RA on admission)  - CXR 1/3: Left lung base pulmonary opacity/pleural effusion, repeat CXR 1/5 decreased opacity  - CTA chest 1/3: Scattered bilateral peripheral ground glass opacities, multiple bilateral pulmonary nodules up to 6 mm in the lt upper lobe.   - Dimer 1784-->848, ferritin 899-->783, CRP 5.1-->3.22, procal 0.11-->0.06   - c/w decadron 6 mg IV daily for 10 days (start date 1/6)  - c/w Lovenox study dose (duplex needed before d/c, done on 1/5, negative for COVID)    # Multiple lung nodules  - Incidental finding on CT scan  - Measuring 6mm in left upper lobe  - f/u o/p in 12 months for f/u CT    # Pleuritic chest pain  - on admission. Resolved   - EKG on admission: NSR, widened QRS at 130ms, peaked T waves, minimal LEANDRA in leads I and aVL with no reciprocal changes.  - trops (-) x2  - low suspicion for pericarditis, echo can't be done given COVID positive  - Cardio consult appreciated: findings in DKA may exhibit similar abnormalities to those with hyperkalemia and hypokalcemia  - will get EKG 1/7 and reassess, consider echo if EKG normalized    # Hypertension  - Controlled   - c/w enalapril 5 mg daily and amlodipine 5 mg daily    # Hx of PUD  - takes celebrex at home  - had LLQ abdominal pain on admission (possibly due to DKA)  - c/w protonix 40 mg daily  - If abdominal pain worsens, order urgent CT A&P w/wo po and IV contrast to r/o infection vs ischemia   - follows GI as o/p, Dr Bobo: [phone]    # Gastroparesis  - monitor BMs  - start on appropriate bowel regimen if constipated     # Misc:  DVT ppx: lovenox (dose blinded, enrolled in research)   GI ppx: PPI  Diet: carb consistent  Activity: IAT tolerated   Code status: Full  Dispo: anticipated for d/c 1/7   71 yo M PMHx DM and PUD, tested covid positive 10 days prior to admission, presented with weakness, decreased PO intake, pleuritic chest pain and abdominal pain of 2 days duration. Pt intially admitted to CEU for management of DKA then downgraded to floor.    # DKA  - Resolved, s/p insulin drip in CEU, was likely precipitated by viral infection (COVID)  - as per pts son, pt recently started on insulin pump but has not taken insulin for past 2 weeks since he was home with covid and his son, who normally aids in his care, was unable to visit him.   - on admissions B-OH=5, AG=24  - Uncontrolled Diabetes, A1C 11.9%  - c/w lantus 27, lispro 9/9/9 with +2 sliding scale (increased from 20, 7/7/7) (will go down on insulin once off steroids)  - Endo eval appreciated, will d/c insulin pump on discharge and c/w same coverage as inpatient (if patient insisting on pump usage, follow up with his endocrinologist outpatient for set up and correction of pump)  - monitor FS    # Acute hypoxic respiratory failure 2/2 COVID PNA  - 1/7 spO2 95% on 3L NC (was on RA on admission)  - CXR 1/3: Left lung base pulmonary opacity/pleural effusion, repeat CXR 1/5 decreased opacity  - CTA chest 1/3: Scattered bilateral peripheral ground glass opacities, multiple bilateral pulmonary nodules up to 6 mm in the lt upper lobe.   - Dimer 1784-->848, ferritin 899-->783, CRP 5.1-->3.22, procal 0.11-->0.06   - c/w decadron 6 mg IV daily for 10 days (start date 1/6)  - c/w Lovenox study dose (duplex needed before d/c, done on 1/5, negative for COVID)    # Multiple lung nodules  - Incidental finding on CT scan  - Measuring 6mm in left upper lobe  - f/u o/p in 12 months for f/u CT    # Pleuritic chest pain  - on admission. Resolved   - EKG on admission: NSR, widened QRS at 130ms, peaked T waves, minimal LEANDRA in leads I and aVL with no reciprocal changes.  - trops (-) x2  - low suspicion for pericarditis, echo can't be done given COVID positive  - Cardio consult appreciated: findings in DKA may exhibit similar abnormalities to those with hyperkalemia and hypokalcemia  - will get EKG 1/7 and reassess, consider echo if EKG normalized    # Hypertension  - Controlled   - c/w enalapril 5 mg daily and amlodipine 5 mg daily    # Hx of PUD  - takes celebrex at home  - had LLQ abdominal pain on admission (possibly due to DKA)  - c/w protonix 40 mg daily  - If abdominal pain worsens, order urgent CT A&P w/wo po and IV contrast to r/o infection vs ischemia   - follows GI as o/p, Dr Bobo: [phone]    # Gastroparesis  - monitor BMs  - start on appropriate bowel regimen if constipated     # Misc:  DVT ppx: lovenox (dose blinded, enrolled in research)   GI ppx: PPI  Diet: carb consistent  Activity: IAT tolerated   Code status: Full  Dispo: Acute

## 2021-01-07 NOTE — CHART NOTE - NSCHARTNOTEFT_GEN_A_CORE
I made rounds today with the treatment team including the hospitalist, residents,  nurses and  and discussed the patient's current medical status and discharge  planning needs, and reviewed the chart.    T(C): 36 (01-07-21 @ 05:00), Max: 36.2 (01-06-21 @ 20:36)  HR: 73 (01-07-21 @ 05:00) (73 - 86)  BP: 140/68 (01-07-21 @ 05:00) (116/60 - 140/68)  RR: 18 (01-07-21 @ 05:00) (18 - 20)  SpO2: 95% (01-07-21 @ 07:54) (94% - 96%)          I reached out to the patient's health care proxy/ responsible family member-           [     ]  I reached                                     and discussed the patient's medical condition,                   family concerns, and discharge planning           [     ]  I left a message with family               [ x    ]  I personally participated in rounds with the medical team and my resident and discussed the case. My resident reached                   family member/ HCP  Lena ( cousin ) [phone]   under my direction and supervision  and we reviewed the conversation          [     ]  My resident left a message with family under my direction and supervision                [     ]   My resident attended rounds and called the family     The following was discussed: updated medical status           [     ] I spent 5-10 minutes on the above discussing medical issues with team members and family and/ or my resident    [ x    ] I spent 11-20 minutes on the above discussing medical issues with team members and family and/ or my resident    [     ] I spent 21-30 minutes on the above discussing medical issues with team members and family and/ or my resident

## 2021-01-07 NOTE — PROGRESS NOTE ADULT - SUBJECTIVE AND OBJECTIVE BOX
MEERA RADER  70y  Male      Patient is a 70y old  Male who presents with a chief complaint of weakness .      INTERVAL HPI/OVERNIGHT EVENTS:      ******************************* REVIEW OF SYSTEMS:**********************************************    All other review of systems negative    *********************** VITALS ******************************************    T(F): 97.4 (01-07-21 @ 13:10)  HR: 98 (01-07-21 @ 13:10) (73 - 98)  BP: 129/65 (01-07-21 @ 13:10) (116/60 - 140/68)  RR: 18 (01-07-21 @ 13:10) (18 - 18)  SpO2: 96% (01-07-21 @ 13:10) (94% - 96%)    01-07-21 @ 07:01  -  01-07-21 @ 15:03  --------------------------------------------------------  IN: 540 mL / OUT: 2 mL / NET: 538 mL            01-07-21 @ 07:01  -  01-07-21 @ 15:03  --------------------------------------------------------  IN: 540 mL / OUT: 2 mL / NET: 538 mL        ******************************** PHYSICAL EXAM:**************************************************  GENERAL: NAD    PSYCH: no agitation, baseline mentation  HEENT:     NERVOUS SYSTEM:  Alert & Oriented X3,   PULMONARY: TAM, CTA    CARDIOVASCULAR: S1S2 RRR    GI: Soft, NT, ND; BS present.    EXTREMITIES:  2+ Peripheral Pulses, No clubbing, cyanosis, or edema    LYMPH: No lymphadenopathy noted    SKIN: No rashes or lesions      **************************** LABS *******************************************************                          15.1   4.83  )-----------( 284      ( 07 Jan 2021 07:54 )             44.1     01-07    132<L>  |  94<L>  |  15  ----------------------------<  338<H>  4.2   |  25  |  0.6<L>    Ca    8.4<L>      07 Jan 2021 07:54  Mg     1.7     01-07    TPro  5.5<L>  /  Alb  2.6<L>  /  TBili  1.2  /  DBili  x   /  AST  45<H>  /  ALT  37  /  AlkPhos  68  01-06          Lactate Trend        CAPILLARY BLOOD GLUCOSE      POCT Blood Glucose.: 380 mg/dL (07 Jan 2021 14:35)          **************************Active Medications *******************************************  No Known Allergies      amLODIPine   Tablet 5 milliGRAM(s) Oral daily  dexAMETHasone  Injectable 6 milliGRAM(s) IV Push daily  dextrose 40% Gel 15 Gram(s) Oral once  dextrose 5%. 1000 milliLiter(s) IV Continuous <Continuous>  dextrose 5%. 1000 milliLiter(s) IV Continuous <Continuous>  dextrose 50% Injectable 25 Gram(s) IV Push once  dextrose 50% Injectable 12.5 Gram(s) IV Push once  dextrose 50% Injectable 25 Gram(s) IV Push once  enalapril 5 milliGRAM(s) Oral daily  enoxaparin Study Injectable () 1 Dose(s) SubCutaneous two times a day  glucagon  Injectable 1 milliGRAM(s) IntraMuscular once  insulin glargine Injectable (LANTUS) 7 Unit(s) SubCutaneous once  insulin lispro (ADMELOG) corrective regimen sliding scale   SubCutaneous three times a day before meals  insulin lispro Injectable (ADMELOG) 9 Unit(s) SubCutaneous three times a day before meals  metoclopramide 10 milliGRAM(s) Oral three times a day  pantoprazole    Tablet 40 milliGRAM(s) Oral before breakfast      ***************************************************  RADIOLOGY & ADDITIONAL TESTS:    Imaging Personally Reviewed:  [ ] YES  [ ] NO    HEALTH ISSUES - PROBLEM Dx:

## 2021-01-07 NOTE — PROGRESS NOTE ADULT - SUBJECTIVE AND OBJECTIVE BOX
SUBJECTIVE:    Patient is a 70y old Male who presents with a chief complaint of weakness (07 Jan 2021 10:23)    Currently admitted to medicine with the primary diagnosis of DKA (diabetic ketoacidoses)       Today is hospital day 4d. This morning he is resting comfortably in bed and reports no new issues or overnight events.     PAST MEDICAL & SURGICAL HISTORY  DM (diabetes mellitus)    No significant past surgical history      SOCIAL HISTORY:  Negative for smoking/alcohol/drug use.     ALLERGIES:  No Known Allergies    MEDICATIONS:  STANDING MEDICATIONS  amLODIPine   Tablet 5 milliGRAM(s) Oral daily  dexAMETHasone  Injectable 6 milliGRAM(s) IV Push daily  dextrose 40% Gel 15 Gram(s) Oral once  dextrose 5%. 1000 milliLiter(s) IV Continuous <Continuous>  dextrose 5%. 1000 milliLiter(s) IV Continuous <Continuous>  dextrose 50% Injectable 25 Gram(s) IV Push once  dextrose 50% Injectable 12.5 Gram(s) IV Push once  dextrose 50% Injectable 25 Gram(s) IV Push once  enalapril 5 milliGRAM(s) Oral daily  enoxaparin Study Injectable () 1 Dose(s) SubCutaneous two times a day  glucagon  Injectable 1 milliGRAM(s) IntraMuscular once  insulin glargine Injectable (LANTUS) 20 Unit(s) SubCutaneous every morning  insulin lispro (ADMELOG) corrective regimen sliding scale   SubCutaneous three times a day before meals  insulin lispro Injectable (ADMELOG) 7 Unit(s) SubCutaneous three times a day before meals  metoclopramide 10 milliGRAM(s) Oral three times a day  pantoprazole    Tablet 40 milliGRAM(s) Oral before breakfast    PRN MEDICATIONS    VITALS:   T(F): 96.8  HR: 73  BP: 140/68  RR: 18  SpO2: 95%    LABS:                        15.1   4.83  )-----------( 284      ( 07 Jan 2021 07:54 )             44.1     01-07    132<L>  |  94<L>  |  15  ----------------------------<  338<H>  4.2   |  25  |  0.6<L>    Ca    8.4<L>      07 Jan 2021 07:54  Mg     1.7     01-07    TPro  5.5<L>  /  Alb  2.6<L>  /  TBili  1.2  /  DBili  x   /  AST  45<H>  /  ALT  37  /  AlkPhos  68  01-06                  RADIOLOGY:  Xray Chest 1/3:  Left lung base pulmonary opacity/pleural effusion.    Xray Chest 1/5:  Decreased left lung opacity/effusion.    CT Angio Chest PE Protocol w/ IV Cont 1/3:  No pulmonary embolus.  Scattered bilateral peripheral groundglass opacities, compatible with infectious/inflammatory etiology (Covid pneumonia).  Multiple bilateral pulmonary nodules measuring up to 6 mm in the left upper lobe. Recommend a 12 month follow-up chest CT.    VA Duplex Lower Ext Vein Scan, Bilat 1/5:  Noevidence of deep venous thrombosis or superficial thrombophlebitis in the bilateral lower extremities.      PHYSICAL EXAM:  GEN: No acute distress  LUNGS: Clear to auscultation bilaterally   HEART: Regular  ABD: Soft, non-tender, non-distended.  EXT: No edema   NEURO: AAOX3

## 2021-01-07 NOTE — PROGRESS NOTE ADULT - ASSESSMENT
69 yo M PMHx DM and PUD, tested covid positive 10 days prior to admission, presented with weakness, decreased PO intake, pleuritic chest pain and abdominal pain of 2 days duration. Pt intially admitted to CEU for management of DKA then downgraded to floor.    # Diabetic Keto Acidosis   - Resolved, s/p insulin drip in CEU, was likely precipitated by viral infection (COVID)  - as per pts son, pt recently started on insulin pump but has not taken insulin for past 2 weeks.  - on admissions B-OH=5, AG=24   A1C 11.9%  - Uncontrolled for Steroid >> adjust lantus/lispro regimen accordingly.   - monitor FS    # Acute hypoxic respiratory failure 2/2 COVID PNA  - spO2 95% on 3L NC  - CXR 1/3: Left lung base pulmonary opacity/pleural effusion, repeat CXR 1/5 decreased opacity  - CTA chest 1/3: Scattered bilateral peripheral ground glass opacities, multiple bilateral pulmonary nodules up to 6 mm in the lt upper lobe.   - Dimer 1784-->848, ferritin 899-->783, CRP 5.1-->3.22, procal 0.11-->0.06   - c/w decadron 6 mg IV daily for 10 days (start date 1/6)  - c/w Lovenox study dose (duplex needed before d/c, done on 1/5, negative for DVT)    # Multiple lung nodules  - Incidental finding on CT scan  - Measuring 6mm in left upper lobe  - f/u o/p in 12 months for f/u CT    # Low suspicion for pericarditis  - pleuritic chest pain on admission   - trops (-) x2  - echo can't be done given COVID positive    # Hypertension  - Controlled   - c/w enalapril 5 mg daily and amlodipine 5 mg daily    # Hx of PUD  - takes celebrex at home  - had LLQ abdominal pain on admission (possibly due to DKA)  - c/w protonix 40 mg daily  - follows GI as o/p, Dr Bobo: [phone]    # Gastroparesis With Hiccups   - on reglan    # Misc:  DVT ppx: lovenox (dose blinded, enrolled in research)   GI ppx: PPI  Diet: carb consistent  Activity: IAT tolerated   Code status: Full    #Progress Note Handoff  Pending (specify):  Clinical improvement / Stability   Disposition: Home

## 2021-01-07 NOTE — CONSULT NOTE ADULT - ASSESSMENT
69 yo M PMHx DM and PUD, tested covid positive 10 days prior to admission, presented with weakness, decreased PO intake.    Current history and lab workup in favor of DKA and was treated in CEU and then downgraded to the floor.  Currently, patient denies any symptoms other than fatigue and denied relevant chest pain while at home.   ECG on admission showed NSR, widened QRS at 130ms, peaked T waves, minimal LEANDRA in leads I and aVL with no reciprocal changes.  ECG in 2014 was unremarkable.   No chest pain, negative troponins, physical exam not suggestive.    ECG findings in DKA may exhibit similar abnormalities to those with hyperkalemia and hypokalcemia such as in this case widening of the QRS complex, peaking of the T waves, and concave ST segment elevation.   ECG has not been repeated since DKA has been resolved.   Please repeat ECG and re-assess.   Consider echo cardio at a later date if ECG normalized. 71 yo M PMHx DM and PUD, tested covid positive 10 days prior to admission, presented with weakness, decreased PO intake.    Current history and lab workup in favor of DKA and was treated in CEU and then downgraded to the floor.  Currently, patient denies any symptoms other than fatigue and denied relevant chest pain while at home.   ECG on admission showed NSR, widened QRS at 130ms, peaked T waves, minimal LEANDRA in leads I and aVL with no reciprocal changes.  ECG in 2014 was unremarkable.   No chest pain, negative troponins, physical exam not suggestive.    ECG findings in DKA may exhibit similar abnormalities to those with hyperkalemia and hypokalcemia such as in this case widening of the QRS complex, peaking of the T waves, and concave ST segment elevation.   ECG has not been repeated since DKA has been resolved.   Please repeat ECG and re-assess.   Consider echo cardio at a later date if ECG normalized.  Management of COVID-19 PNA as per primary team, ID  C/w Insulin for DM control.

## 2021-01-07 NOTE — CONSULT NOTE ADULT - SUBJECTIVE AND OBJECTIVE BOX
HPI:  70 year old male with a PMhx of DMII, PUD.     CC: weakness and decreased PO intake    History goes back to: 2 days ago when patient started having decreased PO intake, and started feeling very fatigued. Patients weakness got worse and so he presented to the ED    ROS is positive for: patient reports chills at home, pleuritic chest pain (only on coughing and taking a deep breath). Additionally patient reports some diffuse abdominal pain, and mild nausea.     ROS is negative for: no SOB, no palpitations. Patient denies change in urinary or bowel habits. No lower extremity edema /swelling     Of note: patient was diagnosed with COVID almost 10 days ago and he was feeling carrol until 2 days ago.     In the ED: vital signs WNL  Labs significant for: d-dimer 1700, b-oh:5, glucose: 361  Troponin: negative  CT A chest: No pulmonary embolus.  Scattered peripheral ground - glass opacities, compatible with viral/infectious etiology.  Multiple bilateral pulmonary nodules measuring up to 6 mm in the left upper lobe.  Patient admitted for DKA.      (03 Jan 2021 07:01)      PAST MEDICAL & SURGICAL HISTORY  DM (diabetes mellitus)    No significant past surgical history        FAMILY HISTORY:  FAMILY HISTORY:      SOCIAL HISTORY:  []smoker  []Alcohol  []Drug    ALLERGIES:  No Known Allergies      MEDICATIONS:  MEDICATIONS  (STANDING):  amLODIPine   Tablet 5 milliGRAM(s) Oral daily  dexAMETHasone  Injectable 6 milliGRAM(s) IV Push daily  dextrose 40% Gel 15 Gram(s) Oral once  dextrose 5%. 1000 milliLiter(s) (50 mL/Hr) IV Continuous <Continuous>  dextrose 5%. 1000 milliLiter(s) (100 mL/Hr) IV Continuous <Continuous>  dextrose 50% Injectable 25 Gram(s) IV Push once  dextrose 50% Injectable 12.5 Gram(s) IV Push once  dextrose 50% Injectable 25 Gram(s) IV Push once  enalapril 5 milliGRAM(s) Oral daily  enoxaparin Study Injectable () 1 Dose(s) SubCutaneous two times a day  glucagon  Injectable 1 milliGRAM(s) IntraMuscular once  insulin glargine Injectable (LANTUS) 20 Unit(s) SubCutaneous every morning  insulin lispro (ADMELOG) corrective regimen sliding scale   SubCutaneous three times a day before meals  insulin lispro Injectable (ADMELOG) 7 Unit(s) SubCutaneous three times a day before meals  metoclopramide 10 milliGRAM(s) Oral three times a day  pantoprazole    Tablet 40 milliGRAM(s) Oral before breakfast    MEDICATIONS  (PRN):      HOME MEDICATIONS:  Home Medications:  insulin pump:  (03 Jan 2021 10:16)      VITALS:   T(F): 96.8 (01-07 @ 05:00), Max: 99 (01-04 @ 16:00)  HR: 73 (01-07 @ 05:00) (73 - 103)  BP: 140/68 (01-07 @ 05:00) (115/57 - 203/100)  BP(mean): 101 (01-05 @ 14:00) (84 - 140)  RR: 18 (01-07 @ 05:00) (17 - 30)  SpO2: 95% (01-07 @ 07:54) (91% - 100%)    I&O's Summary    07 Jan 2021 07:01  -  07 Jan 2021 10:24  --------------------------------------------------------  IN: 260 mL / OUT: 1 mL / NET: 259 mL        REVIEW OF SYSTEMS:  CONSTITUTIONAL: No weakness, fevers or chills  EYES: No visual changes  ENT: No vertigo or throat pain   NECK: No pain or stiffness  RESPIRATORY: No cough, wheezing, hemoptysis; No shortness of breath  CARDIOVASCULAR: No chest pain or palpitations  GASTROINTESTINAL: No abdominal or epigastric pain. No nausea, vomiting, or hematemesis; No diarrhea or constipation. No melena or hematochezia.  GENITOURINARY: No dysuria, frequency or hematuria  NEUROLOGICAL: No numbness or weakness  SKIN: No itching, no rashes  MSK: no    PHYSICAL EXAM:  NEURO: patient is awake , alert and oriented  GEN: Not in acute distress  NECK: no thyroid enlargement, no JVD  LUNGS: Clear to auscultation bilaterally   CARDIOVASCULAR: S1/S2 present, RRR , no murmurs or rubs, no carotid bruits,  + PP bilaterally  ABD: Soft, non-tender, non-distended, +BS  EXT: No DARIO  SKIN: Intact    LABS:                        15.1   4.83  )-----------( 284      ( 07 Jan 2021 07:54 )             44.1     01-07    132<L>  |  94<L>  |  15  ----------------------------<  338<H>  4.2   |  25  |  0.6<L>    Ca    8.4<L>      07 Jan 2021 07:54  Mg     1.7     01-07    TPro  5.5<L>  /  Alb  2.6<L>  /  TBili  1.2  /  DBili  x   /  AST  45<H>  /  ALT  37  /  AlkPhos  68  01-06    Troponin trend:  Troponin T, Serum (01.03.21 @ 12:45)    Troponin T, Serum: <0.01 ng/mL  Troponin T, Serum (01.03.21 @ 00:16)    Troponin T, Serum: <0.01 ng/mL      RADIOLOGY:  -CXR: (01.05.21 @ 06:03) >Decreased left lung opacity/effusion.    -Chest CT:  (01.03.21 @ 07:58) >  No pulmonary embolus.  Scattered bilateral peripheral groundglass opacities, compatible with infectious/inflammatory etiology (Covid pneumonia).  Multiple bilateral pulmonary nodules measuring up to 6 mm in the left upper lobe. Recommend a 12 month follow-up chest CT.    -TTE: N/A  -CCTA: N/A  -STRESS TEST: N/A  -CATHETERIZATION: N/A    ECG: NSR, widened QRS at 130ms, peaked T waves, minimal LEANDRA in leads I and aVL with no reciprocal changes.  ECG in 2014 was unremarkable.     TELEMETRY EVENTS: None   HPI:  70 year old male with a PMhx of DMII, PUD.     CC: weakness and decreased PO intake    History goes back to: 2 days ago when patient started having decreased PO intake, and started feeling very fatigued. Patients weakness got worse and so he presented to the ED    ROS is positive for: patient reports chills at home, pleuritic chest pain (only on coughing and taking a deep breath). Additionally patient reports some diffuse abdominal pain, and mild nausea.     ROS is negative for: no SOB, no palpitations. Patient denies change in urinary or bowel habits. No lower extremity edema /swelling     Of note: patient was diagnosed with COVID almost 10 days ago and he was feeling carrol until 2 days ago.     In the ED: vital signs WNL  Labs significant for: d-dimer 1700, b-oh:5, glucose: 361  Troponin: negative  CT A chest: No pulmonary embolus.  Scattered peripheral ground - glass opacities, compatible with viral/infectious etiology.  Multiple bilateral pulmonary nodules measuring up to 6 mm in the left upper lobe.  Patient admitted for DKA.      (03 Jan 2021 07:01)        PAST MEDICAL & SURGICAL HISTORY  DM (diabetes mellitus)    No significant past surgical history        FAMILY HISTORY:  FAMILY HISTORY:      SOCIAL HISTORY:  []smoker  []Alcohol  []Drug    ALLERGIES:  No Known Allergies      MEDICATIONS:  MEDICATIONS  (STANDING):  amLODIPine   Tablet 5 milliGRAM(s) Oral daily  dexAMETHasone  Injectable 6 milliGRAM(s) IV Push daily  dextrose 40% Gel 15 Gram(s) Oral once  dextrose 5%. 1000 milliLiter(s) (50 mL/Hr) IV Continuous <Continuous>  dextrose 5%. 1000 milliLiter(s) (100 mL/Hr) IV Continuous <Continuous>  dextrose 50% Injectable 25 Gram(s) IV Push once  dextrose 50% Injectable 12.5 Gram(s) IV Push once  dextrose 50% Injectable 25 Gram(s) IV Push once  enalapril 5 milliGRAM(s) Oral daily  enoxaparin Study Injectable () 1 Dose(s) SubCutaneous two times a day  glucagon  Injectable 1 milliGRAM(s) IntraMuscular once  insulin glargine Injectable (LANTUS) 20 Unit(s) SubCutaneous every morning  insulin lispro (ADMELOG) corrective regimen sliding scale   SubCutaneous three times a day before meals  insulin lispro Injectable (ADMELOG) 7 Unit(s) SubCutaneous three times a day before meals  metoclopramide 10 milliGRAM(s) Oral three times a day  pantoprazole    Tablet 40 milliGRAM(s) Oral before breakfast    MEDICATIONS  (PRN):      HOME MEDICATIONS:  Home Medications:  insulin pump:  (03 Jan 2021 10:16)      VITALS:   T(F): 96.8 (01-07 @ 05:00), Max: 99 (01-04 @ 16:00)  HR: 73 (01-07 @ 05:00) (73 - 103)  BP: 140/68 (01-07 @ 05:00) (115/57 - 203/100)  BP(mean): 101 (01-05 @ 14:00) (84 - 140)  RR: 18 (01-07 @ 05:00) (17 - 30)  SpO2: 95% (01-07 @ 07:54) (91% - 100%)    I&O's Summary    07 Jan 2021 07:01  -  07 Jan 2021 10:24  --------------------------------------------------------  IN: 260 mL / OUT: 1 mL / NET: 259 mL        REVIEW OF SYSTEMS:  CONSTITUTIONAL: No weakness, fevers or chills  EYES: No visual changes  ENT: No vertigo or throat pain   NECK: No pain or stiffness  RESPIRATORY: No cough, wheezing, hemoptysis; No shortness of breath  CARDIOVASCULAR: No chest pain or palpitations  GASTROINTESTINAL: No abdominal or epigastric pain. No nausea, vomiting, or hematemesis; No diarrhea or constipation. No melena or hematochezia.  GENITOURINARY: No dysuria, frequency or hematuria  NEUROLOGICAL: No numbness or weakness  SKIN: No itching, no rashes  MSK: no    PHYSICAL EXAM:  NEURO: patient is awake , alert and oriented  GEN: Not in acute distress  HEENT: NC/AT  NECK: no thyroid enlargement, no JVD  LUNGS: Clear to auscultation bilaterally   CARDIOVASCULAR: S1/S2 present, RRR , no murmurs or rubs, no carotid bruits,  + PP bilaterally  ABD: Soft, non-tender, non-distended, +BS  EXT: No DARIO  SKIN: Intact    LABS:                        15.1   4.83  )-----------( 284      ( 07 Jan 2021 07:54 )             44.1     01-07    132<L>  |  94<L>  |  15  ----------------------------<  338<H>  4.2   |  25  |  0.6<L>    Ca    8.4<L>      07 Jan 2021 07:54  Mg     1.7     01-07    TPro  5.5<L>  /  Alb  2.6<L>  /  TBili  1.2  /  DBili  x   /  AST  45<H>  /  ALT  37  /  AlkPhos  68  01-06    Troponin trend:  Troponin T, Serum (01.03.21 @ 12:45)    Troponin T, Serum: <0.01 ng/mL  Troponin T, Serum (01.03.21 @ 00:16)    Troponin T, Serum: <0.01 ng/mL      RADIOLOGY:  -CXR: (01.05.21 @ 06:03) >Decreased left lung opacity/effusion.    -Chest CT:  (01.03.21 @ 07:58) >  No pulmonary embolus.  Scattered bilateral peripheral groundglass opacities, compatible with infectious/inflammatory etiology (Covid pneumonia).  Multiple bilateral pulmonary nodules measuring up to 6 mm in the left upper lobe. Recommend a 12 month follow-up chest CT.    -TTE: N/A  -CCTA: N/A  -STRESS TEST: N/A  -CATHETERIZATION: N/A    ECG: NSR, widened QRS at 130ms, peaked T waves, minimal LEANDRA in leads I and aVL with no reciprocal changes.  ECG in 2014 was unremarkable.     TELEMETRY EVENTS: None

## 2021-01-08 LAB
ANION GAP SERPL CALC-SCNC: 9 MMOL/L — SIGNIFICANT CHANGE UP (ref 7–14)
BASOPHILS # BLD AUTO: 0.01 K/UL — SIGNIFICANT CHANGE UP (ref 0–0.2)
BASOPHILS NFR BLD AUTO: 0.2 % — SIGNIFICANT CHANGE UP (ref 0–1)
BUN SERPL-MCNC: 17 MG/DL — SIGNIFICANT CHANGE UP (ref 10–20)
CALCIUM SERPL-MCNC: 9.6 MG/DL — SIGNIFICANT CHANGE UP (ref 8.5–10.1)
CHLORIDE SERPL-SCNC: 95 MMOL/L — LOW (ref 98–110)
CO2 SERPL-SCNC: 30 MMOL/L — SIGNIFICANT CHANGE UP (ref 17–32)
CREAT SERPL-MCNC: 0.7 MG/DL — SIGNIFICANT CHANGE UP (ref 0.7–1.5)
D DIMER BLD IA.RAPID-MCNC: 414 NG/ML DDU — HIGH (ref 0–230)
EOSINOPHIL # BLD AUTO: 0.01 K/UL — SIGNIFICANT CHANGE UP (ref 0–0.7)
EOSINOPHIL NFR BLD AUTO: 0.2 % — SIGNIFICANT CHANGE UP (ref 0–8)
FIBRINOGEN PPP-MCNC: 610 MG/DL — HIGH (ref 204.4–570.6)
GLUCOSE BLDC GLUCOMTR-MCNC: 146 MG/DL — HIGH (ref 70–99)
GLUCOSE BLDC GLUCOMTR-MCNC: 197 MG/DL — HIGH (ref 70–99)
GLUCOSE BLDC GLUCOMTR-MCNC: 257 MG/DL — HIGH (ref 70–99)
GLUCOSE BLDC GLUCOMTR-MCNC: 269 MG/DL — HIGH (ref 70–99)
GLUCOSE BLDC GLUCOMTR-MCNC: 393 MG/DL — HIGH (ref 70–99)
GLUCOSE BLDC GLUCOMTR-MCNC: 416 MG/DL — HIGH (ref 70–99)
GLUCOSE SERPL-MCNC: 349 MG/DL — HIGH (ref 70–99)
HCT VFR BLD CALC: 45.1 % — SIGNIFICANT CHANGE UP (ref 42–52)
HGB BLD-MCNC: 15.3 G/DL — SIGNIFICANT CHANGE UP (ref 14–18)
IMM GRANULOCYTES NFR BLD AUTO: 0.8 % — HIGH (ref 0.1–0.3)
INR BLD: 0.88 RATIO — SIGNIFICANT CHANGE UP (ref 0.65–1.3)
LYMPHOCYTES # BLD AUTO: 0.9 K/UL — LOW (ref 1.2–3.4)
LYMPHOCYTES # BLD AUTO: 18.1 % — LOW (ref 20.5–51.1)
MAGNESIUM SERPL-MCNC: 1.7 MG/DL — LOW (ref 1.8–2.4)
MCHC RBC-ENTMCNC: 29.7 PG — SIGNIFICANT CHANGE UP (ref 27–31)
MCHC RBC-ENTMCNC: 33.9 G/DL — SIGNIFICANT CHANGE UP (ref 32–37)
MCV RBC AUTO: 87.4 FL — SIGNIFICANT CHANGE UP (ref 80–94)
MONOCYTES # BLD AUTO: 0.28 K/UL — SIGNIFICANT CHANGE UP (ref 0.1–0.6)
MONOCYTES NFR BLD AUTO: 5.6 % — SIGNIFICANT CHANGE UP (ref 1.7–9.3)
NEUTROPHILS # BLD AUTO: 3.73 K/UL — SIGNIFICANT CHANGE UP (ref 1.4–6.5)
NEUTROPHILS NFR BLD AUTO: 75.1 % — SIGNIFICANT CHANGE UP (ref 42.2–75.2)
NRBC # BLD: 0 /100 WBCS — SIGNIFICANT CHANGE UP (ref 0–0)
PLATELET # BLD AUTO: 190 K/UL — SIGNIFICANT CHANGE UP (ref 130–400)
POTASSIUM SERPL-MCNC: 4.3 MMOL/L — SIGNIFICANT CHANGE UP (ref 3.5–5)
POTASSIUM SERPL-SCNC: 4.3 MMOL/L — SIGNIFICANT CHANGE UP (ref 3.5–5)
PROTHROM AB SERPL-ACNC: 10.1 SEC — SIGNIFICANT CHANGE UP (ref 9.95–12.87)
RBC # BLD: 5.16 M/UL — SIGNIFICANT CHANGE UP (ref 4.7–6.1)
RBC # FLD: 12.7 % — SIGNIFICANT CHANGE UP (ref 11.5–14.5)
SODIUM SERPL-SCNC: 134 MMOL/L — LOW (ref 135–146)
TROPONIN T SERPL-MCNC: <0.01 NG/ML — SIGNIFICANT CHANGE UP
WBC # BLD: 4.97 K/UL — SIGNIFICANT CHANGE UP (ref 4.8–10.8)
WBC # FLD AUTO: 4.97 K/UL — SIGNIFICANT CHANGE UP (ref 4.8–10.8)

## 2021-01-08 PROCEDURE — 93970 EXTREMITY STUDY: CPT | Mod: 26

## 2021-01-08 PROCEDURE — 99233 SBSQ HOSP IP/OBS HIGH 50: CPT

## 2021-01-08 RX ORDER — MAGNESIUM SULFATE 500 MG/ML
2 VIAL (ML) INJECTION ONCE
Refills: 0 | Status: COMPLETED | OUTPATIENT
Start: 2021-01-08 | End: 2021-01-08

## 2021-01-08 RX ORDER — INSULIN HUMAN 100 [IU]/ML
8 INJECTION, SOLUTION SUBCUTANEOUS ONCE
Refills: 0 | Status: COMPLETED | OUTPATIENT
Start: 2021-01-08 | End: 2021-01-08

## 2021-01-08 RX ORDER — INSULIN LISPRO 100/ML
12 VIAL (ML) SUBCUTANEOUS
Refills: 0 | Status: DISCONTINUED | OUTPATIENT
Start: 2021-01-08 | End: 2021-01-09

## 2021-01-08 RX ORDER — CHLORHEXIDINE GLUCONATE 213 G/1000ML
1 SOLUTION TOPICAL
Refills: 0 | Status: DISCONTINUED | OUTPATIENT
Start: 2021-01-08 | End: 2021-01-10

## 2021-01-08 RX ORDER — INSULIN GLARGINE 100 [IU]/ML
28 INJECTION, SOLUTION SUBCUTANEOUS EVERY MORNING
Refills: 0 | Status: DISCONTINUED | OUTPATIENT
Start: 2021-01-08 | End: 2021-01-09

## 2021-01-08 RX ADMIN — INSULIN HUMAN 8 UNIT(S): 100 INJECTION, SOLUTION SUBCUTANEOUS at 13:33

## 2021-01-08 RX ADMIN — INSULIN GLARGINE 27 UNIT(S): 100 INJECTION, SOLUTION SUBCUTANEOUS at 07:52

## 2021-01-08 RX ADMIN — Medication 10 MILLIGRAM(S): at 05:13

## 2021-01-08 RX ADMIN — Medication 6 MILLIGRAM(S): at 05:13

## 2021-01-08 RX ADMIN — Medication 12 UNIT(S): at 16:57

## 2021-01-08 RX ADMIN — Medication 12: at 11:40

## 2021-01-08 RX ADMIN — Medication 50 GRAM(S): at 17:11

## 2021-01-08 RX ADMIN — Medication 9 UNIT(S): at 07:44

## 2021-01-08 RX ADMIN — Medication 10 MILLIGRAM(S): at 12:02

## 2021-01-08 RX ADMIN — Medication 10 MILLIGRAM(S): at 21:00

## 2021-01-08 RX ADMIN — Medication 6: at 07:45

## 2021-01-08 RX ADMIN — PANTOPRAZOLE SODIUM 40 MILLIGRAM(S): 20 TABLET, DELAYED RELEASE ORAL at 05:14

## 2021-01-08 RX ADMIN — Medication 9 UNIT(S): at 11:40

## 2021-01-08 RX ADMIN — Medication 5 MILLIGRAM(S): at 05:13

## 2021-01-08 RX ADMIN — AMLODIPINE BESYLATE 5 MILLIGRAM(S): 2.5 TABLET ORAL at 05:13

## 2021-01-08 RX ADMIN — Medication 2: at 16:57

## 2021-01-08 NOTE — PROGRESS NOTE ADULT - SUBJECTIVE AND OBJECTIVE BOX
MEERA RADER  70y  Male      Patient is a 70y old  Male who presents with a chief complaint of weakness.      INTERVAL HPI/OVERNIGHT EVENTS: The patient was seen at bedside.       ******************************* REVIEW OF SYSTEMS:********************************************    All other review of systems negative    *********************** VITALS ******************************************    T(F): 96.7 (01-08-21 @ 13:00)  HR: 73 (01-08-21 @ 13:00) (73 - 80)  BP: 132/65 (01-08-21 @ 13:00) (116/56 - 157/67)  RR: 18 (01-08-21 @ 13:00) (18 - 18)  SpO2: 93% (01-08-21 @ 13:00) (92% - 98%)    01-07-21 @ 07:01  -  01-08-21 @ 07:00  --------------------------------------------------------  IN: 540 mL / OUT: 2 mL / NET: 538 mL            01-07-21 @ 07:01  -  01-08-21 @ 07:00  --------------------------------------------------------  IN: 540 mL / OUT: 2 mL / NET: 538 mL        ******************************** PHYSICAL EXAM:**************************************************  GENERAL: NAD    PSYCH: no agitation, baseline mentation  HEENT:     NERVOUS SYSTEM:  Alert & Oriented X3, MS  5/5 B/L  UE and LE ; Sensory intact    PULMONARY: TAM, CTA    CARDIOVASCULAR: S1S2 RRR    GI: Soft, NT, ND; BS present.    EXTREMITIES:  2+ Peripheral Pulses, No clubbing, cyanosis, or edema    LYMPH: No lymphadenopathy noted    SKIN: No rashes or lesions      **************************** LABS *******************************************************                          15.3   4.97  )-----------( 190      ( 08 Jan 2021 08:23 )             45.1     01-08    134<L>  |  95<L>  |  17  ----------------------------<  349<H>  4.3   |  30  |  0.7    Ca    9.6      08 Jan 2021 08:23  Mg     1.7     01-08          PT/INR - ( 08 Jan 2021 08:23 )   PT: 10.10 sec;   INR: 0.88 ratio           Lactate Trend    CARDIAC MARKERS ( 08 Jan 2021 08:23 )  x     / <0.01 ng/mL / x     / x     / x          CAPILLARY BLOOD GLUCOSE      POCT Blood Glucose.: 269 mg/dL (08 Jan 2021 14:36)          **************************Active Medications *******************************************  No Known Allergies      amLODIPine   Tablet 5 milliGRAM(s) Oral daily  chlorhexidine 4% Liquid 1 Application(s) Topical <User Schedule>  dexAMETHasone  Injectable 6 milliGRAM(s) IV Push daily  dextrose 40% Gel 15 Gram(s) Oral once  dextrose 5%. 1000 milliLiter(s) IV Continuous <Continuous>  dextrose 5%. 1000 milliLiter(s) IV Continuous <Continuous>  dextrose 50% Injectable 25 Gram(s) IV Push once  dextrose 50% Injectable 12.5 Gram(s) IV Push once  dextrose 50% Injectable 25 Gram(s) IV Push once  enalapril 5 milliGRAM(s) Oral daily  enoxaparin Study Injectable () 1 Dose(s) SubCutaneous two times a day  glucagon  Injectable 1 milliGRAM(s) IntraMuscular once  insulin glargine Injectable (LANTUS) 28 Unit(s) SubCutaneous every morning  insulin lispro (ADMELOG) corrective regimen sliding scale   SubCutaneous three times a day before meals  insulin lispro Injectable (ADMELOG) 12 Unit(s) SubCutaneous three times a day before meals  metoclopramide 10 milliGRAM(s) Oral three times a day  pantoprazole    Tablet 40 milliGRAM(s) Oral before breakfast      ***************************************************  RADIOLOGY & ADDITIONAL TESTS:    Imaging Personally Reviewed:  [ ] YES  [ ] NO    HEALTH ISSUES - PROBLEM Dx:

## 2021-01-08 NOTE — RESEARCH COMMUNICATION NOTE - NS AS RESEARCH COMMUNICATION NOTE FT
NO events related to LOVENOX    For outpatient DVT Prophylaxis regimen at the time of discharge, please refer to the most current guidelines regarding Extended DVT Prophylaxis for COVID19 patients: SUNRISE ---> TOOLS (top bar) --->COV19 Treatement Protocols.     Discussed with medical team

## 2021-01-08 NOTE — PROGRESS NOTE ADULT - SUBJECTIVE AND OBJECTIVE BOX
SUBJECTIVE:    Patient is a 70y old Male who presents with a chief complaint of weakness (07 Jan 2021 15:03)    Currently admitted to medicine with the primary diagnosis of DKA (diabetic ketoacidoses)       Today is hospital day 5d. This morning he is resting comfortably in bed and reports no new issues or overnight events.     PAST MEDICAL & SURGICAL HISTORY  DM (diabetes mellitus)    No significant past surgical history      SOCIAL HISTORY:  Negative for smoking/alcohol/drug use.     ALLERGIES:  No Known Allergies    MEDICATIONS:  STANDING MEDICATIONS  amLODIPine   Tablet 5 milliGRAM(s) Oral daily  dexAMETHasone  Injectable 6 milliGRAM(s) IV Push daily  dextrose 40% Gel 15 Gram(s) Oral once  dextrose 5%. 1000 milliLiter(s) IV Continuous <Continuous>  dextrose 5%. 1000 milliLiter(s) IV Continuous <Continuous>  dextrose 50% Injectable 25 Gram(s) IV Push once  dextrose 50% Injectable 12.5 Gram(s) IV Push once  dextrose 50% Injectable 25 Gram(s) IV Push once  enalapril 5 milliGRAM(s) Oral daily  enoxaparin Study Injectable () 1 Dose(s) SubCutaneous two times a day  glucagon  Injectable 1 milliGRAM(s) IntraMuscular once  insulin glargine Injectable (LANTUS) 27 Unit(s) SubCutaneous every morning  insulin lispro (ADMELOG) corrective regimen sliding scale   SubCutaneous three times a day before meals  insulin lispro Injectable (ADMELOG) 9 Unit(s) SubCutaneous three times a day before meals  metoclopramide 10 milliGRAM(s) Oral three times a day  pantoprazole    Tablet 40 milliGRAM(s) Oral before breakfast    PRN MEDICATIONS    VITALS:   T(F): 97.3  HR: 79  BP: 157/67  RR: 18  SpO2: 94%    LABS:                        15.1   4.83  )-----------( 284      ( 07 Jan 2021 07:54 )             44.1     01-07    132<L>  |  94<L>  |  15  ----------------------------<  338<H>  4.2   |  25  |  0.6<L>    Ca    8.4<L>      07 Jan 2021 07:54  Mg     1.7     01-07                    RADIOLOGY:    PHYSICAL EXAM:  GEN: No acute distress  LUNGS: Clear to auscultation bilaterally   HEART: Regular  ABD: Soft, non-tender, non-distended.  EXT: NC/NC/NE/2+PP/TREVIZO/Skin Intact.   NEURO: AAOX3    Intravenous access:   NG tube:   Aguirre Catheter:        SUBJECTIVE:    Patient is a 70y old Male who presents with a chief complaint of weakness (07 Jan 2021 15:03)    Currently admitted to medicine with the primary diagnosis of DKA (diabetic ketoacidoses)       Today is hospital day 5d. This morning he is resting comfortably in bed and reports no new issues or overnight events.     PAST MEDICAL & SURGICAL HISTORY  DM (diabetes mellitus)    No significant past surgical history      SOCIAL HISTORY:  Negative for smoking/alcohol/drug use.     ALLERGIES:  No Known Allergies    MEDICATIONS:  STANDING MEDICATIONS  amLODIPine   Tablet 5 milliGRAM(s) Oral daily  dexAMETHasone  Injectable 6 milliGRAM(s) IV Push daily  dextrose 40% Gel 15 Gram(s) Oral once  dextrose 5%. 1000 milliLiter(s) IV Continuous <Continuous>  dextrose 5%. 1000 milliLiter(s) IV Continuous <Continuous>  dextrose 50% Injectable 25 Gram(s) IV Push once  dextrose 50% Injectable 12.5 Gram(s) IV Push once  dextrose 50% Injectable 25 Gram(s) IV Push once  enalapril 5 milliGRAM(s) Oral daily  enoxaparin Study Injectable () 1 Dose(s) SubCutaneous two times a day  glucagon  Injectable 1 milliGRAM(s) IntraMuscular once  insulin glargine Injectable (LANTUS) 27 Unit(s) SubCutaneous every morning  insulin lispro (ADMELOG) corrective regimen sliding scale   SubCutaneous three times a day before meals  insulin lispro Injectable (ADMELOG) 9 Unit(s) SubCutaneous three times a day before meals  metoclopramide 10 milliGRAM(s) Oral three times a day  pantoprazole    Tablet 40 milliGRAM(s) Oral before breakfast    PRN MEDICATIONS    VITALS:   T(F): 97.3  HR: 79  BP: 157/67  RR: 18  SpO2: 94%    LABS:                        15.1   4.83  )-----------( 284      ( 07 Jan 2021 07:54 )             44.1     01-07    132<L>  |  94<L>  |  15  ----------------------------<  338<H>  4.2   |  25  |  0.6<L>    Ca    8.4<L>      07 Jan 2021 07:54  Mg     1.7     01-07                    RADIOLOGY:  Xray Chest 1/3:  Left lung base pulmonary opacity/pleural effusion.    Xray Chest 1/5:  Decreased left lung opacity/effusion.    CT Angio Chest PE Protocol w/ IV Cont 1/3:  No pulmonary embolus.  Scattered bilateral peripheral groundglass opacities, compatible with infectious/inflammatory etiology (Covid pneumonia).  Multiple bilateral pulmonary nodules measuring up to 6 mm in the left upper lobe. Recommend a 12 month follow-up chest CT.    VA Duplex Lower Ext Vein Scan, Bilat 1/5:  No evidence of deep venous thrombosis or superficial thrombophlebitis in the bilateral lower extremities.      PHYSICAL EXAM:  GEN: No acute distress  LUNGS: Clear to auscultation bilaterally   HEART: Regular  ABD: Soft, non-tender, non-distended.  EXT: No edema   NEURO: AAOX3

## 2021-01-08 NOTE — DISCHARGE NOTE NURSING/CASE MANAGEMENT/SOCIAL WORK - PATIENT PORTAL LINK FT
You can access the FollowMyHealth Patient Portal offered by [facility] by registering at the following website: [email or web address]. By joining [facility] FollowMyHealth portal, you will also be able to view your health information using other applications (apps) compatible with our system.

## 2021-01-08 NOTE — PROGRESS NOTE ADULT - ASSESSMENT
69 yo M PMHx DM and PUD, tested covid positive 10 days prior to admission, presented with weakness, decreased PO intake, pleuritic chest pain and abdominal pain of 2 days duration. Pt intially admitted to CEU for management of DKA then downgraded to floor.    # Diabetic Keto Acidosis   - Resolved, s/p insulin drip in CEU, was likely precipitated by (COVID)  - as per pts son, pt recently started on insulin pump but has not taken insulin for past 2 weeks.   A1C 11.9%  - Uncontrolled for Steroid >> adjust lantus/lispro regimen accordingly.   - monitor FS    # Acute hypoxic respiratory failure 2/2 COVID PNA  - spO2 95% on 3L NC     Repeat CXR 1/5 decreased opacity  - CTA chest 1/3: Scattered bilateral peripheral ground glass opacities, multiple bilateral pulmonary nodules up to 6 mm in the lt upper lobe.   - Dimer 1784-->848, ferritin 899-->783, CRP 5.1-->3.22, procal 0.11-->0.06   - c/w decadron 6 mg IV daily for 10 days (start date 1/6)  - c/w Lovenox study dose (duplex needed before d/c, done on 1/5, negative for DVT)    # Multiple lung nodules  - Incidental finding on CT scan  - Measuring 6mm in left upper lobe  - f/u o/p in 12 months for f/u CT    # Low suspicion for pericarditis  - pleuritic chest pain on admission   - trops (-) x2  - echo can't be done given COVID positive    # Hypertension  - Controlled   - c/w enalapril 5 mg daily and amlodipine 5 mg daily    # Hx of PUD  - takes celebrex at home  - had LLQ abdominal pain on admission (possibly due to DKA)  - c/w protonix 40 mg daily  - follows GI as o/p, Dr Bobo: [phone]    # Gastroparesis With Hiccups   - on reglan    # Misc:  DVT ppx: lovenox (dose blinded, enrolled in research)   GI ppx: PPI  Diet: carb consistent  Activity: IAT tolerated   Code status: Full    #Progress Note Handoff  Pending (specify):  Clinical improvement / Stability   Disposition: Home ( [facility] for now )

## 2021-01-08 NOTE — CHART NOTE - NSCHARTNOTEFT_GEN_A_CORE
I made rounds today with the treatment team including the hospitalist, residents,  nurses and  and discussed the patient's current medical status and discharge  planning needs, and reviewed the chart.    T(C): 36.3 (01-08-21 @ 05:00), Max: 36.3 (01-07-21 @ 13:10)  HR: 79 (01-08-21 @ 05:00) (79 - 98)  BP: 157/67 (01-08-21 @ 05:00) (116/56 - 157/67)  RR: 18 (01-08-21 @ 05:00) (18 - 18)  SpO2: 94% (01-08-21 @ 07:48) (92% - 98%)          I reached out to the patient's health care proxy/ responsible family member-           [     ]  I reached                                     and discussed the patient's medical condition,                   family concerns, and discharge planning           [     ]  I left a message with family               [  x   ]  I personally participated in rounds with the medical team and my resident and discussed the case. My resident reached                   family member/ HCP  Lena ( cousin ) [phone]     under my direction and supervision  and we reviewed the conversation          [     ]  My resident left a message with family under my direction and supervision                [     ]   My resident attended rounds and called the family     The following was discussed: updated medical status          [     ] I spent 5-10 minutes on the above discussing medical issues with team members and family and/ or my resident    [  x   ] I spent 11-20 minutes on the above discussing medical issues with team members and family and/ or my resident    [     ] I spent 21-30 minutes on the above discussing medical issues with team members and family and/ or my resident

## 2021-01-08 NOTE — PROGRESS NOTE ADULT - ASSESSMENT
71 yo M PMHx DM and PUD, tested covid positive 10 days prior to admission, presented with weakness, decreased PO intake, pleuritic chest pain and abdominal pain of 2 days duration. Pt intially admitted to CEU for management of DKA then downgraded to floor.    # DKA  - Resolved, s/p insulin drip in CEU, was likely precipitated by viral infection (COVID)  - as per pts son, pt recently started on insulin pump but has not taken insulin for past 2 weeks since he was home with covid and his son, who normally aids in his care, was unable to visit him.   - on admissions B-OH=5, AG=24  - Uncontrolled Diabetes, A1C 11.9%  - c/w lantus 27, lispro 9/9/9 with +2 sliding scale (increased from 20, 7/7/7) (will go down on insulin once off steroids)  - Endo eval appreciated, will d/c insulin pump on discharge and c/w same coverage as inpatient (if patient insisting on pump usage, follow up with his endocrinologist outpatient for set up and correction of pump)  - monitor FS    # Acute hypoxic respiratory failure 2/2 COVID PNA  - 1/7 spO2 95% on 3L NC (was on RA on admission)  - CXR 1/3: Left lung base pulmonary opacity/pleural effusion, repeat CXR 1/5 decreased opacity  - CTA chest 1/3: Scattered bilateral peripheral ground glass opacities, multiple bilateral pulmonary nodules up to 6 mm in the lt upper lobe.   - Dimer 1784-->848, ferritin 899-->783, CRP 5.1-->3.22, procal 0.11-->0.06   - c/w decadron 6 mg IV daily for 10 days (start date 1/6)  - c/w Lovenox study dose (duplex needed before d/c, done on 1/5, negative for COVID)    # Multiple lung nodules  - Incidental finding on CT scan  - Measuring 6mm in left upper lobe  - f/u o/p in 12 months for f/u CT    # Pleuritic chest pain  - on admission. Resolved   - EKG on admission: NSR, widened QRS at 130ms, peaked T waves, minimal LEANDRA in leads I and aVL with no reciprocal changes.  - trops (-) x2  - low suspicion for pericarditis, echo can't be done given COVID positive  - Cardio consult appreciated: findings in DKA may exhibit similar abnormalities to those with hyperkalemia and hypokalcemia  - will get EKG 1/7 and reassess, consider echo if EKG normalized    # Hypertension  - Controlled   - c/w enalapril 5 mg daily and amlodipine 5 mg daily    # Hx of PUD  - takes celebrex at home  - had LLQ abdominal pain on admission (possibly due to DKA)  - c/w protonix 40 mg daily  - If abdominal pain worsens, order urgent CT A&P w/wo po and IV contrast to r/o infection vs ischemia   - follows GI as o/p, Dr Bobo: [phone]    # Gastroparesis  - monitor BMs  - start on appropriate bowel regimen if constipated     # Misc:  DVT ppx: lovenox (dose blinded, enrolled in research)   GI ppx: PPI  Diet: carb consistent  Activity: IAT tolerated   Code status: Full  Dispo: Acute   71 yo M PMHx DM and PUD, tested covid positive 10 days prior to admission, presented with weakness, decreased PO intake, pleuritic chest pain and abdominal pain of 2 days duration. Pt intially admitted to CEU for management of DKA then downgraded to floor.    # DKA  - Resolved, s/p insulin drip in CEU, was likely precipitated by viral infection (COVID)  - as per pts son, pt recently started on insulin pump but has not taken insulin for past 2 weeks since he was home with covid and his son, who normally aids in his care, was unable to visit him.   - on admissions B-OH=5, AG=24  - Uncontrolled Diabetes, A1C 11.9%  - c/w lantus 28, lispro 12/12/12 with +2 sliding scale (increased from 27, 9/9/9 on 1/8) (Please consider decreasing insulin dose once pt is off steroids) Blood glucose has been challenging to control, requiring occasional IV pushes  - Endo recs earlier: d/c insulin pump on discharge and c/w lantus 20 an lispro 7/7/7 but pt has been requiring more insulin afterwards and had to increase his dose (if patient insisting on pump usage, follow up with his endocrinologist outpatient for set up and correction of pump)  - monitor FS    # Acute hypoxic respiratory failure 2/2 COVID PNA  - 1/8 spO2 94% on 3L NC (was on RA on admission)  - CXR 1/3: Left lung base pulmonary opacity/pleural effusion, repeat CXR 1/5 decreased opacity  - CTA chest 1/3: Scattered bilateral peripheral ground glass opacities, multiple bilateral pulmonary nodules up to 6 mm in the lt upper lobe.   - Dimer 1784-->848, ferritin 899-->783, CRP 5.1-->3.22, procal 0.11-->0.06   - c/w decadron 6 mg IV daily for 10 days (start date 1/6)  - c/w Lovenox study dose (As per research protocol, pt needs LE venous duplex on the day of discharge, so please make sure it is done) has had 2 negative on 1/5 and 1/8 already as he was anticipated for d/c    # Multiple lung nodules  - Incidental finding on CT scan  - Measuring 6mm in left upper lobe  - f/u o/p in 12 months for f/u CT    # Pleuritic chest pain  - on admission. Resolved   - EKG on admission: NSR, widened QRS at 130ms, peaked T waves, minimal LEANDRA in leads I and aVL with no reciprocal changes.  - trops (-) x2  - low suspicion for pericarditis, echo can't be done given COVID positive  - Cardio consult appreciated: findings in DKA may exhibit similar abnormalities to those with hyperkalemia and hypokalcemia  - will get EKG 1/7 and reassess, consider echo if EKG normalized    # Hypertension  - Controlled   - c/w enalapril 5 mg daily and amlodipine 5 mg daily    # Hx of PUD  - takes celebrex at home  - had LLQ abdominal pain on admission (possibly due to DKA)  - c/w protonix 40 mg daily  - If abdominal pain worsens, order urgent CT A&P w/wo po and IV contrast to r/o infection vs ischemia   - follows GI as o/p, Dr Bobo: [phone]    # Gastroparesis  - monitor BMs  - start on appropriate bowel regimen if constipated     # Misc:  DVT ppx: lovenox (dose blinded, enrolled in research)   GI ppx: PPI  Diet: carb consistent  Activity: IAT tolerated   Code status: Full  Dispo: Acute

## 2021-01-09 LAB
CRP SERPL-MCNC: 1.48 MG/DL — HIGH (ref 0–0.4)
GLUCOSE BLDC GLUCOMTR-MCNC: 332 MG/DL — HIGH (ref 70–99)
GLUCOSE BLDC GLUCOMTR-MCNC: 332 MG/DL — HIGH (ref 70–99)
GLUCOSE BLDC GLUCOMTR-MCNC: 358 MG/DL — HIGH (ref 70–99)
GLUCOSE BLDC GLUCOMTR-MCNC: 361 MG/DL — HIGH (ref 70–99)
GLUCOSE BLDC GLUCOMTR-MCNC: 426 MG/DL — HIGH (ref 70–99)

## 2021-01-09 PROCEDURE — 99232 SBSQ HOSP IP/OBS MODERATE 35: CPT

## 2021-01-09 RX ORDER — INSULIN LISPRO 100/ML
15 VIAL (ML) SUBCUTANEOUS
Refills: 0 | Status: DISCONTINUED | OUTPATIENT
Start: 2021-01-09 | End: 2021-01-10

## 2021-01-09 RX ORDER — INSULIN GLARGINE 100 [IU]/ML
30 INJECTION, SOLUTION SUBCUTANEOUS AT BEDTIME
Refills: 0 | Status: DISCONTINUED | OUTPATIENT
Start: 2021-01-09 | End: 2021-01-10

## 2021-01-09 RX ADMIN — INSULIN GLARGINE 28 UNIT(S): 100 INJECTION, SOLUTION SUBCUTANEOUS at 09:25

## 2021-01-09 RX ADMIN — Medication 12 UNIT(S): at 11:57

## 2021-01-09 RX ADMIN — Medication 5 MILLIGRAM(S): at 06:10

## 2021-01-09 RX ADMIN — PANTOPRAZOLE SODIUM 40 MILLIGRAM(S): 20 TABLET, DELAYED RELEASE ORAL at 06:10

## 2021-01-09 RX ADMIN — Medication 10 MILLIGRAM(S): at 06:09

## 2021-01-09 RX ADMIN — Medication 15 UNIT(S): at 17:23

## 2021-01-09 RX ADMIN — CHLORHEXIDINE GLUCONATE 1 APPLICATION(S): 213 SOLUTION TOPICAL at 06:10

## 2021-01-09 RX ADMIN — Medication 6 MILLIGRAM(S): at 06:08

## 2021-01-09 RX ADMIN — Medication 8: at 17:24

## 2021-01-09 RX ADMIN — AMLODIPINE BESYLATE 5 MILLIGRAM(S): 2.5 TABLET ORAL at 06:10

## 2021-01-09 RX ADMIN — Medication 12 UNIT(S): at 09:26

## 2021-01-09 RX ADMIN — Medication 12: at 11:58

## 2021-01-09 RX ADMIN — Medication 10 MILLIGRAM(S): at 22:11

## 2021-01-09 RX ADMIN — Medication 10: at 09:24

## 2021-01-09 RX ADMIN — Medication 10 MILLIGRAM(S): at 14:01

## 2021-01-09 RX ADMIN — INSULIN GLARGINE 30 UNIT(S): 100 INJECTION, SOLUTION SUBCUTANEOUS at 22:12

## 2021-01-09 NOTE — CHART NOTE - NSCHARTNOTEFT_GEN_A_CORE
IF O2 SAT <88% AT REST: Patient's O2 sat is _89_% on room air at rest. Patient has been diagnosed with _covid__ and will therefore need home O2. Patient is aware that he/she will be going home on oxygen. Patient was tested in a chronic stable state.    IF O2 SAT >88% ON ROOM AIR AT REST BUT LESS THAN 88% ON AMBUALTION: Patient's O2 sat is 89__% on room air at rest. Patient's O2 sat is 86__% on room air with ambualtion. Patient's O2 sat is 93__% on _2_LPM via nasal cannula upon ambulation. Patient has been diagnosed with _covid__ and therefore needs home O2. Patient is aware that he/she will be going home on oxygen. Patient was tested in a chronic stable state.

## 2021-01-09 NOTE — CHART NOTE - NSCHARTNOTESELECT_GEN_ALL_CORE
communication/Event Note
communication/Event Note
downgrade/Event Note
Event Note
Family Update/Event Note
Transfer Note
communication/Event Note
family
oxygen

## 2021-01-09 NOTE — PROGRESS NOTE ADULT - ASSESSMENT
A- Patient is 70 year old male seen at [facility]. Patient is comfortable and in no apparent distress. Vitals and physical exam are normal.     P- Patients inpatient plan is as follows  # Diabetic Keto Acidosis   - Resolved, s/p insulin drip in CEU, was likely precipitated by (COVID)  - as per pts son, pt recently started on insulin pump but has not taken insulin for past 2 weeks.   A1C 11.9%  - Uncontrolled for Steroid >> adjust lantus/lispro regimen accordingly.   - monitor FS    # Acute hypoxic respiratory failure 2/2 COVID PNA  - spO2 95% on 3L NC     Repeat CXR 1/5 decreased opacity  - CTA chest 1/3: Scattered bilateral peripheral ground glass opacities, multiple bilateral pulmonary nodules up to 6 mm in the lt upper lobe.   - Dimer 1784-->848, ferritin 899-->783, CRP 5.1-->3.22, procal 0.11-->0.06   - c/w decadron 6 mg IV daily for 10 days (start date 1/6)  - c/w Lovenox study dose (duplex needed before d/c, done on 1/5, negative for DVT)    # Multiple lung nodules  - Incidental finding on CT scan  - Measuring 6mm in left upper lobe  - f/u o/p in 12 months for f/u CT    # Low suspicion for pericarditis  - pleuritic chest pain on admission   - trops (-) x2  - echo can't be done given COVID positive    # Hypertension  - Controlled   - c/w enalapril 5 mg daily and amlodipine 5 mg daily    # Hx of PUD  - takes celebrex at home  - had LLQ abdominal pain on admission (possibly due to DKA)  - c/w protonix 40 mg daily  - follows GI as o/p, Dr Bobo: [phone]    # Gastroparesis With Hiccups   - on reglan    # Misc:  DVT ppx: lovenox (dose blinded, enrolled in research)   GI ppx: PPI  Diet: carb consistent  Activity: IAT tolerated   Code status: Full   A- Patient is 70 year old male seen at [facility]. Patient is comfortable and in no apparent distress. Vitals and physical exam are normal.     P- Patients inpatient plan is as follows  # Diabetic Keto Acidosis   - Resolved, s/p insulin drip in CEU, was likely precipitated by (COVID)  - as per pts son, pt recently started on insulin pump but has not taken insulin for past 2 weeks.   A1C 11.9%  - Uncontrolled for Steroid >> adjust lantus/lispro regimen accordingly.   - monitor FS  - lantus increased to 30units, premeal increased to 15.     # Acute hypoxic respiratory failure 2/2 COVID PNA  - spO2 95% on 3L NC     Repeat CXR 1/5 decreased opacity  - CTA chest 1/3: Scattered bilateral peripheral ground glass opacities, multiple bilateral pulmonary nodules up to 6 mm in the lt upper lobe.   - Dimer 1784-->848, ferritin 899-->783, CRP 5.1-->3.22, procal 0.11-->0.06   - c/w decadron 6 mg IV daily for 10 days (start date 1/6)  - c/w Lovenox study dose (duplex needed before d/c, done on 1/5, negative for DVT)    # Multiple lung nodules  - Incidental finding on CT scan  - Measuring 6mm in left upper lobe  - f/u o/p in 12 months for f/u CT    # Low suspicion for pericarditis  - pleuritic chest pain on admission   - trops (-) x2  - echo can't be done given COVID positive    # Hypertension  - Controlled   - c/w enalapril 5 mg daily and amlodipine 5 mg daily    # Hx of PUD  - takes celebrex at home  - had LLQ abdominal pain on admission (possibly due to DKA)  - c/w protonix 40 mg daily  - follows GI as o/p, Dr Bobo: [phone]    # Gastroparesis With Hiccups   - on reglan    # Misc:  DVT ppx: lovenox (dose blinded, enrolled in research)   GI ppx: PPI  Diet: carb consistent  Activity: IAT tolerated   Code status: Full

## 2021-01-09 NOTE — PROGRESS NOTE ADULT - SUBJECTIVE AND OBJECTIVE BOX
S- Patient seen and examined at [facility]. Patient has no complaints at this time.     O-  Vitals:  T 98.5 F  HR- 67 bpm  BP- 131/65 mmHg  R- 94% 2L NC    Physical Exam  Constitutional- Patient laying in bed in no apparent distress  Cardiac-S1 S2 RRR  Pulmonary- Clear to auscultation bilateral  Abdomen- soft nontender nondistended.   MSK- Full ROM of all 4 ext  Neuro- AOx3

## 2021-01-09 NOTE — CHART NOTE - NSCHARTNOTEFT_GEN_A_CORE
Notified family: Bairon  Update given: discussed will wean off O2, if sat drops on ambulation will order Home O2.   All questions and concerns addressed to family's satisfaction.  Family encouraged to contact me with any further concerns.

## 2021-01-09 NOTE — PROGRESS NOTE ADULT - ASSESSMENT
A- Patient is 70 year old male seen at [facility]. Patient is comfortable and in no apparent distress. Vitals and physical exam are normal.     P- Patients inpatient plan is as follows  # Diabetic Keto Acidosis   - Resolved, s/p insulin drip in CEU, was likely precipitated by (COVID)  - as per pts son, pt recently started on insulin pump but has not taken insulin for past 2 weeks.   A1C 11.9%  - Uncontrolled for Steroid >> adjust lantus/lispro regimen accordingly.   - monitor FS    # Acute hypoxic respiratory failure 2/2 COVID PNA  - spO2 95% on 3L NC     Repeat CXR 1/5 decreased opacity  - CTA chest 1/3: Scattered bilateral peripheral ground glass opacities, multiple bilateral pulmonary nodules up to 6 mm in the lt upper lobe.   - Dimer 1784-->848, ferritin 899-->783, CRP 5.1-->3.22, procal 0.11-->0.06   - c/w decadron 6 mg IV daily for 10 days (start date 1/6)  - c/w Lovenox study dose (duplex needed before d/c, done on 1/5, negative for DVT)    # Multiple lung nodules  - Incidental finding on CT scan  - Measuring 6mm in left upper lobe  - f/u o/p in 12 months for f/u CT    # Low suspicion for pericarditis  - pleuritic chest pain on admission   - trops (-) x2  - echo can't be done given COVID positive    # Hypertension  - Controlled   - c/w enalapril 5 mg daily and amlodipine 5 mg daily    # Hx of PUD  - takes celebrex at home  - had LLQ abdominal pain on admission (possibly due to DKA)  - c/w protonix 40 mg daily  - follows GI as o/p, Dr Bobo: [phone]    # Gastroparesis With Hiccups   - on reglan    # Misc:  DVT ppx: lovenox (dose blinded, enrolled in research)   GI ppx: PPI  Diet: carb consistent  Activity: IAT tolerated   Code status: Full

## 2021-01-09 NOTE — RESEARCH COMMUNICATION NOTE - NS AS RESEARCH COMMUNICATION NOTE FT
TO MEDICAL TEAM ([facility]): PATIENT WILL NEED A LOWER EXTREMITY DUPLEX ON 1/12 (RESEARCH PROTOCOL) IN ADDITION TO BLOOD WORK (ORDERS TO FOLLOW)    This is a reminder that patient is currently enrolled in HepCOVID clinical trial and receiving a blinded (study) dose of Low-Molecular Weight Heparin (Lovenox). No events related to the study drug reported.     For any issue please reach out to the research department (Meenakshi [phone] or Teams) and Dr Lema ([phone]/ Teams/ or cell: [phone]).

## 2021-01-09 NOTE — PROGRESS NOTE ADULT - PROVIDER SPECIALTY LIST ADULT
Critical Care
Internal Medicine
Hospitalist
Internal Medicine

## 2021-01-09 NOTE — PROGRESS NOTE ADULT - SUBJECTIVE AND OBJECTIVE BOX
Name: MEERA RADER  Age: 70y  Gender: Male        HOD 6  Pt was seen and examined.       Allergies:  No Known Allergies      PHYSICAL EXAM:    Vitals:  Vital Signs Last 24 Hrs  T(C): 36.5 (09 Jan 2021 11:23), Max: 36.9 (09 Jan 2021 04:46)  T(F): 97.7 (09 Jan 2021 11:23), Max: 98.5 (09 Jan 2021 04:46)  HR: 73 (09 Jan 2021 11:23) (67 - 79)  BP: 124/73 (09 Jan 2021 11:23) (110/63 - 131/65)  BP(mean): --  RR: 18 (09 Jan 2021 11:23) (18 - 18)  SpO2: 95% (09 Jan 2021 11:23) (93% - 97%)    GENERAL: NAD  CHEST/LUNG: CTAB  HEART: S1,S2 RRR  ABDOMEN: Soft, NT/ND, +BS  EXTREMITIES:  2+ DP, no LE edema      LABS:                        15.3   4.97  )-----------( 190      ( 08 Jan 2021 08:23 )             45.1     01-08    134<L>  |  95<L>  |  17  ----------------------------<  349<H>  4.3   |  30  |  0.7    Ca    9.6      08 Jan 2021 08:23  Mg     1.7     01-08        CARDIAC MARKERS ( 08 Jan 2021 08:23 )  x     / <0.01 ng/mL / x     / x     / x            MEDICATIONS  (STANDING):  amLODIPine   Tablet 5 milliGRAM(s) Oral daily  chlorhexidine 4% Liquid 1 Application(s) Topical <User Schedule>  dexAMETHasone  Injectable 6 milliGRAM(s) IV Push daily  dextrose 40% Gel 15 Gram(s) Oral once  dextrose 5%. 1000 milliLiter(s) (50 mL/Hr) IV Continuous <Continuous>  dextrose 5%. 1000 milliLiter(s) (100 mL/Hr) IV Continuous <Continuous>  dextrose 50% Injectable 25 Gram(s) IV Push once  dextrose 50% Injectable 12.5 Gram(s) IV Push once  dextrose 50% Injectable 25 Gram(s) IV Push once  enalapril 5 milliGRAM(s) Oral daily  enoxaparin Study Injectable () 1 Dose(s) SubCutaneous two times a day  glucagon  Injectable 1 milliGRAM(s) IntraMuscular once  insulin glargine Injectable (LANTUS) 28 Unit(s) SubCutaneous every morning  insulin lispro (ADMELOG) corrective regimen sliding scale   SubCutaneous three times a day before meals  insulin lispro Injectable (ADMELOG) 12 Unit(s) SubCutaneous three times a day before meals  metoclopramide 10 milliGRAM(s) Oral three times a day  pantoprazole    Tablet 40 milliGRAM(s) Oral before breakfast        RADIOLOGY & ADDITIONAL TESTS:    Imaging Personally Reviewed:  [x] YES  [ ] NO

## 2021-01-10 VITALS
HEART RATE: 81 BPM | TEMPERATURE: 98 F | RESPIRATION RATE: 18 BRPM | OXYGEN SATURATION: 94 % | SYSTOLIC BLOOD PRESSURE: 115 MMHG | DIASTOLIC BLOOD PRESSURE: 64 MMHG

## 2021-01-10 LAB
GLUCOSE BLDC GLUCOMTR-MCNC: 393 MG/DL — HIGH (ref 70–99)
GLUCOSE BLDC GLUCOMTR-MCNC: 456 MG/DL — CRITICAL HIGH (ref 70–99)

## 2021-01-10 PROCEDURE — 99232 SBSQ HOSP IP/OBS MODERATE 35: CPT

## 2021-01-10 RX ORDER — AMLODIPINE BESYLATE 2.5 MG/1
1 TABLET ORAL
Qty: 0 | Refills: 0 | DISCHARGE
Start: 2021-01-10

## 2021-01-10 RX ORDER — INSULIN HUMAN 100 [IU]/ML
0 INJECTION, SOLUTION SUBCUTANEOUS
Qty: 0 | Refills: 0 | DISCHARGE

## 2021-01-10 RX ORDER — RIVAROXABAN 15 MG-20MG
1 KIT ORAL
Qty: 21 | Refills: 0
Start: 2021-01-10 | End: 2021-01-30

## 2021-01-10 RX ORDER — PANTOPRAZOLE SODIUM 20 MG/1
1 TABLET, DELAYED RELEASE ORAL
Qty: 0 | Refills: 0 | DISCHARGE
Start: 2021-01-10

## 2021-01-10 RX ADMIN — Medication 15 UNIT(S): at 12:06

## 2021-01-10 RX ADMIN — PANTOPRAZOLE SODIUM 40 MILLIGRAM(S): 20 TABLET, DELAYED RELEASE ORAL at 05:34

## 2021-01-10 RX ADMIN — Medication 10 MILLIGRAM(S): at 05:32

## 2021-01-10 RX ADMIN — CHLORHEXIDINE GLUCONATE 1 APPLICATION(S): 213 SOLUTION TOPICAL at 05:32

## 2021-01-10 RX ADMIN — Medication 5 MILLIGRAM(S): at 05:32

## 2021-01-10 RX ADMIN — AMLODIPINE BESYLATE 5 MILLIGRAM(S): 2.5 TABLET ORAL at 05:32

## 2021-01-10 RX ADMIN — Medication 12: at 08:30

## 2021-01-10 RX ADMIN — Medication 10: at 12:05

## 2021-01-10 RX ADMIN — Medication 15 UNIT(S): at 08:31

## 2021-01-10 RX ADMIN — Medication 6 MILLIGRAM(S): at 05:33

## 2021-01-10 NOTE — DISCHARGE NOTE PROVIDER - HOSPITAL COURSE
70 year old male with a PMhx of DMII, PUD admitted on 1/3/2021 for DKA and Covid-19.  Pt cc weakness and decreased PO intake 2 days prior to admission. Pt reported chills at home, pleuritic chest pain (only on coughing and taking a deep breath). Additionally patient reported some diffuse abdominal pain, and mild nausea.  Pt downgraded from the ICU on 1/4/2021 where her did well and transferred East on 1/5/2021.  Pt oxygenating 95% RA and ambulating without desaturation.  Pt safe for discharge where he lives with his family.  Pt will complete course of prednisone and Xarelto and f/u with PCP.             70 year old male with a PMhx of DMII, PUD admitted on 1/3/2021 for DKA and Covid-19.  Pt cc weakness and decreased PO intake 2 days prior to admission. Pt reported chills at home, pleuritic chest pain (only on coughing and taking a deep breath). Additionally patient reported some diffuse abdominal pain, and mild nausea.  Pt downgraded from the ICU on 1/4/2021 where her did well and transferred East on 1/5/2021.  Pt oxygenating 95% RA and ambulating without desaturation.  Pt safe for discharge where he lives with his family.  Pt will complete course of prednisone and Xarelto and f/u with PCP.          ****Continue with all diabetic medications as directed by your PCP****

## 2021-01-10 NOTE — DISCHARGE NOTE PROVIDER - NSDCMRMEDTOKEN_GEN_ALL_CORE_FT
insulin pump:   predniSONE 20 mg oral tablet: 2 tab(s) orally once a day   Xarelto 10 mg oral tablet: 1 tab(s) orally once a day    amLODIPine 5 mg oral tablet: 1 tab(s) orally once a day  enalapril 5 mg oral tablet: 1 tab(s) orally once a day  pantoprazole 40 mg oral delayed release tablet: 1 tab(s) orally once a day (before a meal)  predniSONE 20 mg oral tablet: 2 tab(s) orally once a day   Xarelto 10 mg oral tablet: 1 tab(s) orally once a day

## 2021-01-10 NOTE — DISCHARGE NOTE PROVIDER - NSDCCPCAREPLAN_GEN_ALL_CORE_FT
PRINCIPAL DISCHARGE DIAGNOSIS  Diagnosis: DKA (diabetic ketoacidoses)  Assessment and Plan of Treatment: continue all of your present medications      SECONDARY DISCHARGE DIAGNOSES  Diagnosis: COVID-19  Assessment and Plan of Treatment: Prednisone 40mg once a day until completed  Xarelto 10 mg once a day until completed

## 2021-01-14 DIAGNOSIS — Z96.41 PRESENCE OF INSULIN PUMP (EXTERNAL) (INTERNAL): ICD-10-CM

## 2021-01-14 DIAGNOSIS — R07.9 CHEST PAIN, UNSPECIFIED: ICD-10-CM

## 2021-01-14 DIAGNOSIS — R06.6 HICCOUGH: ICD-10-CM

## 2021-01-14 DIAGNOSIS — R91.8 OTHER NONSPECIFIC ABNORMAL FINDING OF LUNG FIELD: ICD-10-CM

## 2021-01-14 DIAGNOSIS — E11.10 TYPE 2 DIABETES MELLITUS WITH KETOACIDOSIS WITHOUT COMA: ICD-10-CM

## 2021-01-14 DIAGNOSIS — Z79.4 LONG TERM (CURRENT) USE OF INSULIN: ICD-10-CM

## 2021-01-14 DIAGNOSIS — K31.84 GASTROPARESIS: ICD-10-CM

## 2021-01-14 DIAGNOSIS — J96.01 ACUTE RESPIRATORY FAILURE WITH HYPOXIA: ICD-10-CM

## 2021-01-14 DIAGNOSIS — I10 ESSENTIAL (PRIMARY) HYPERTENSION: ICD-10-CM

## 2021-01-14 DIAGNOSIS — E11.43 TYPE 2 DIABETES MELLITUS WITH DIABETIC AUTONOMIC (POLY)NEUROPATHY: ICD-10-CM

## 2021-01-14 DIAGNOSIS — Z91.14 PATIENT'S OTHER NONCOMPLIANCE WITH MEDICATION REGIMEN: ICD-10-CM

## 2021-01-14 DIAGNOSIS — Z00.6 ENCOUNTER FOR EXAMINATION FOR NORMAL COMPARISON AND CONTROL IN CLINICAL RESEARCH PROGRAM: ICD-10-CM

## 2021-01-14 DIAGNOSIS — U07.1 COVID-19: ICD-10-CM

## 2021-01-14 DIAGNOSIS — R63.0 ANOREXIA: ICD-10-CM

## 2021-01-14 DIAGNOSIS — K27.9 PEPTIC ULCER, SITE UNSPECIFIED, UNSPECIFIED AS ACUTE OR CHRONIC, WITHOUT HEMORRHAGE OR PERFORATION: ICD-10-CM

## 2021-01-14 DIAGNOSIS — D89.839 CYTOKINE RELEASE SYNDROME, GRADE UNSPECIFIED: ICD-10-CM

## 2021-01-14 DIAGNOSIS — E87.2 ACIDOSIS: ICD-10-CM

## 2021-01-14 DIAGNOSIS — J12.82 PNEUMONIA DUE TO CORONAVIRUS DISEASE 2019: ICD-10-CM

## 2024-02-12 NOTE — PATIENT PROFILE ADULT - TOBACCO USE
You are seen emerged permit for medical clearance for incarceration with an abrasion to right wrist.  There is no signs of infection at this time.  Please keep it clean with soap and water.  Use Vaseline or antibiotic to keep it soft and supple.    You have been medically cleared in the emergency department.    Please return to the emergency department or seek medical attention if you develop:  Fever, spreading redness, uncontrollable shakes, vomiting, any other new or concerning findings   Never smoker

## 2024-10-30 PROBLEM — E11.9 TYPE 2 DIABETES MELLITUS WITHOUT COMPLICATIONS: Chronic | Status: ACTIVE | Noted: 2021-01-03

## 2024-10-30 PROBLEM — Z00.00 ENCOUNTER FOR PREVENTIVE HEALTH EXAMINATION: Status: ACTIVE | Noted: 2024-10-30

## 2024-10-31 ENCOUNTER — OUTPATIENT (OUTPATIENT)
Dept: OUTPATIENT SERVICES | Facility: HOSPITAL | Age: 74
LOS: 1 days | End: 2024-10-31
Payer: MEDICARE

## 2024-10-31 DIAGNOSIS — Z00.8 ENCOUNTER FOR OTHER GENERAL EXAMINATION: ICD-10-CM

## 2024-10-31 DIAGNOSIS — Z86.718 PERSONAL HISTORY OF OTHER VENOUS THROMBOSIS AND EMBOLISM: ICD-10-CM

## 2024-10-31 PROCEDURE — 93970 EXTREMITY STUDY: CPT

## 2024-10-31 PROCEDURE — 93970 EXTREMITY STUDY: CPT | Mod: 26

## 2024-11-01 DIAGNOSIS — Z86.718 PERSONAL HISTORY OF OTHER VENOUS THROMBOSIS AND EMBOLISM: ICD-10-CM

## 2025-08-14 ENCOUNTER — APPOINTMENT (OUTPATIENT)
Dept: ORTHOPEDIC SURGERY | Facility: CLINIC | Age: 75
End: 2025-08-14